# Patient Record
Sex: FEMALE | Race: BLACK OR AFRICAN AMERICAN | NOT HISPANIC OR LATINO | Employment: OTHER | URBAN - METROPOLITAN AREA
[De-identification: names, ages, dates, MRNs, and addresses within clinical notes are randomized per-mention and may not be internally consistent; named-entity substitution may affect disease eponyms.]

---

## 2017-03-19 ENCOUNTER — RADIANT APPOINTMENT (OUTPATIENT)
Dept: GENERAL RADIOLOGY | Facility: CLINIC | Age: 32
End: 2017-03-19
Attending: PHYSICIAN ASSISTANT
Payer: COMMERCIAL

## 2017-03-19 ENCOUNTER — OFFICE VISIT (OUTPATIENT)
Dept: URGENT CARE | Facility: URGENT CARE | Age: 32
End: 2017-03-19
Payer: COMMERCIAL

## 2017-03-19 ENCOUNTER — HOSPITAL ENCOUNTER (EMERGENCY)
Facility: CLINIC | Age: 32
Discharge: HOME | End: 2017-03-19
Attending: CLINICAL NURSE SPECIALIST | Admitting: CLINICAL NURSE SPECIALIST
Payer: COMMERCIAL

## 2017-03-19 ENCOUNTER — APPOINTMENT (OUTPATIENT)
Dept: ULTRASOUND IMAGING | Facility: CLINIC | Age: 32
End: 2017-03-19
Attending: CLINICAL NURSE SPECIALIST
Payer: COMMERCIAL

## 2017-03-19 VITALS
DIASTOLIC BLOOD PRESSURE: 78 MMHG | HEART RATE: 101 BPM | SYSTOLIC BLOOD PRESSURE: 123 MMHG | BODY MASS INDEX: 31.32 KG/M2 | OXYGEN SATURATION: 99 % | RESPIRATION RATE: 18 BRPM | TEMPERATURE: 98.2 F | HEIGHT: 65 IN | WEIGHT: 188 LBS

## 2017-03-19 VITALS
DIASTOLIC BLOOD PRESSURE: 64 MMHG | TEMPERATURE: 97.6 F | OXYGEN SATURATION: 100 % | SYSTOLIC BLOOD PRESSURE: 128 MMHG | HEART RATE: 82 BPM | WEIGHT: 188.1 LBS | BODY MASS INDEX: 31.3 KG/M2

## 2017-03-19 DIAGNOSIS — M25.562 ACUTE PAIN OF LEFT KNEE: ICD-10-CM

## 2017-03-19 DIAGNOSIS — M79.662 PAIN OF LEFT CALF: ICD-10-CM

## 2017-03-19 DIAGNOSIS — Z32.00 PREGNANCY EXAMINATION OR TEST, PREGNANCY UNCONFIRMED: ICD-10-CM

## 2017-03-19 DIAGNOSIS — M25.562 ACUTE PAIN OF LEFT KNEE: Primary | ICD-10-CM

## 2017-03-19 DIAGNOSIS — M71.22 BAKER'S CYST, LEFT: ICD-10-CM

## 2017-03-19 DIAGNOSIS — M25.462 EFFUSION OF LEFT KNEE: ICD-10-CM

## 2017-03-19 LAB
BASOPHILS # BLD AUTO: 0 10E9/L (ref 0–0.2)
BASOPHILS NFR BLD AUTO: 0.3 %
BETA HCG QUAL IFA URINE: NEGATIVE
D DIMER PPP FEU-MCNC: 0.7 UG/ML FEU (ref 0–0.5)
DIFFERENTIAL METHOD BLD: ABNORMAL
EOSINOPHIL # BLD AUTO: 0.2 10E9/L (ref 0–0.7)
EOSINOPHIL NFR BLD AUTO: 3 %
ERYTHROCYTE [DISTWIDTH] IN BLOOD BY AUTOMATED COUNT: 17.7 % (ref 10–15)
HCT VFR BLD AUTO: 39.1 % (ref 35–47)
HGB BLD-MCNC: 12.4 G/DL (ref 11.7–15.7)
LYMPHOCYTES # BLD AUTO: 1.9 10E9/L (ref 0.8–5.3)
LYMPHOCYTES NFR BLD AUTO: 31.2 %
MCH RBC QN AUTO: 28.6 PG (ref 26.5–33)
MCHC RBC AUTO-ENTMCNC: 31.7 G/DL (ref 31.5–36.5)
MCV RBC AUTO: 90 FL (ref 78–100)
MONOCYTES # BLD AUTO: 0.6 10E9/L (ref 0–1.3)
MONOCYTES NFR BLD AUTO: 9.3 %
NEUTROPHILS # BLD AUTO: 3.4 10E9/L (ref 1.6–8.3)
NEUTROPHILS NFR BLD AUTO: 56.2 %
PLATELET # BLD AUTO: 272 10E9/L (ref 150–450)
RBC # BLD AUTO: 4.33 10E12/L (ref 3.8–5.2)
WBC # BLD AUTO: 6 10E9/L (ref 4–11)

## 2017-03-19 PROCEDURE — 36415 COLL VENOUS BLD VENIPUNCTURE: CPT | Performed by: PHYSICIAN ASSISTANT

## 2017-03-19 PROCEDURE — 25000132 ZZH RX MED GY IP 250 OP 250 PS 637: Performed by: CLINICAL NURSE SPECIALIST

## 2017-03-19 PROCEDURE — 85379 FIBRIN DEGRADATION QUANT: CPT | Performed by: PHYSICIAN ASSISTANT

## 2017-03-19 PROCEDURE — 85025 COMPLETE CBC W/AUTO DIFF WBC: CPT | Performed by: PHYSICIAN ASSISTANT

## 2017-03-19 PROCEDURE — 84703 CHORIONIC GONADOTROPIN ASSAY: CPT | Performed by: PHYSICIAN ASSISTANT

## 2017-03-19 PROCEDURE — 93971 EXTREMITY STUDY: CPT | Mod: LT

## 2017-03-19 PROCEDURE — 73562 X-RAY EXAM OF KNEE 3: CPT | Mod: LT

## 2017-03-19 PROCEDURE — 99213 OFFICE O/P EST LOW 20 MIN: CPT | Performed by: PHYSICIAN ASSISTANT

## 2017-03-19 PROCEDURE — 99284 EMERGENCY DEPT VISIT MOD MDM: CPT | Mod: 25

## 2017-03-19 RX ORDER — TRAMADOL HYDROCHLORIDE 50 MG/1
50 TABLET ORAL EVERY 6 HOURS PRN
Qty: 12 TABLET | Refills: 0 | Status: ON HOLD | OUTPATIENT
Start: 2017-03-19 | End: 2018-07-03

## 2017-03-19 RX ORDER — IBUPROFEN 200 MG
600 TABLET ORAL ONCE
Status: COMPLETED | OUTPATIENT
Start: 2017-03-19 | End: 2017-03-19

## 2017-03-19 RX ADMIN — IBUPROFEN 600 MG: 200 TABLET, FILM COATED ORAL at 20:22

## 2017-03-19 ASSESSMENT — ENCOUNTER SYMPTOMS
SHORTNESS OF BREATH: 0
SORE THROAT: 0
COUGH: 0
JOINT SWELLING: 1
WOUND: 0
FEVER: 0
ARTHRALGIAS: 1
RHINORRHEA: 0

## 2017-03-19 NOTE — MR AVS SNAPSHOT
"              After Visit Summary   3/19/2017    Isaías Ontiveros    MRN: [id number]           Patient Information     Date Of Birth          1985        Visit Information        Provider Department      3/19/2017 6:15 PM Teodoro Martinez PA-C [facility]        Today's Diagnoses     Acute pain of left knee    -  1    Pain of left calf        Pregnancy examination or test, pregnancy unconfirmed           Follow-ups after your visit        Who to contact     If you have questions or need follow up information about today's clinic visit or your schedule please contact [facility] directly at [phone].  Normal or non-critical lab and imaging results will be communicated to you by Draftstreethart, letter or phone within 4 business days after the clinic has received the results. If you do not hear from us within 7 days, please contact the clinic through Draftstreethart or phone. If you have a critical or abnormal lab result, we will notify you by phone as soon as possible.  Submit refill requests through Help Scout or call your pharmacy and they will forward the refill request to us. Please allow 3 business days for your refill to be completed.          Additional Information About Your Visit        MyChart Information     Help Scout lets you send messages to your doctor, view your test results, renew your prescriptions, schedule appointments and more. To sign up, go to [email or web address] . Click on \"Log in\" on the left side of the screen, which will take you to the Welcome page. Then click on \"Sign up Now\" on the right side of the page.     You will be asked to enter the access code listed below, as well as some personal information. Please follow the directions to create your username and password.     Your access code is: [email or web address]  Expires: 2017  1:40 PM     Your access code will  in 90 days. If you need help or a new code, please call your [facility] " clinic or [phone].        Care EveryWhere ID     This is your Care EveryWhere ID. This could be used by other organizations to access your [facility] medical records  [id number]        Your Vitals Were     Pulse Temperature Pulse Oximetry BMI (Body Mass Index)          82 97.6  F (36.4  C) (Oral) 100% 31.3 kg/m2         Blood Pressure from Last 3 Encounters:   03/19/17 128/64   01/02/17 111/75   10/13/15 122/78    Weight from Last 3 Encounters:   03/19/17 188 lb 1.6 oz (85.3 kg)   12/31/16 200 lb (90.7 kg)   10/13/15 161 lb 6.4 oz (73.2 kg)              We Performed the Following     Beta HCG qual IFA urine     CBC with platelets and differential     D dimer, quantitative        Primary Care Provider Office Phone # Fax #    Stephen Velásquez [phone]       [facility] [address] [facility] [address]        Thank you!     Thank you for choosing [facility]  for your care. Our goal is always to provide you with excellent care. Hearing back from our patients is one way we can continue to improve our services. Please take a few minutes to complete the written survey that you may receive in the mail after your visit with us. Thank you!             Your Updated Medication List - Protect others around you: Learn how to safely use, store and throw away your medicines at www.disposemymeds.org.          This list is accurate as of: 3/19/17  7:21 PM.  Always use your most recent med list.                   Brand Name Dispense Instructions for use    acetaminophen 500 MG tablet    TYLENOL    30 tablet    Take 1 tablet (500 mg) by mouth every 4 hours as needed for mild pain       breast pump Misc     1 each    1 each as needed       ibuprofen 600 MG tablet    ADVIL/MOTRIN    30 tablet    Take 1 tablet (600 mg) by mouth every 6 hours as needed for other (cramping)       prenatal multivitamin  plus iron 27-0.8 MG Tabs per tablet      Take 1 tablet by mouth daily        senna-docusate 8.6-50 MG per tablet    SENOKOT-S;PERICOLACE    30 tablet    Take 1-2 tablets by mouth 2 times daily as needed for constipation
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
## 2017-03-19 NOTE — ED AVS SNAPSHOT
Emergency Department    [address] [place] [address]    Phone:  [phone]    Fax:  [phone]                                       Isaías Ontiveros   MRN: [id number]    Department:   Emergency Department   Date of Visit:  3/19/2017           After Visit Summary Signature Page     I have received my discharge instructions, and my questions have been answered. I have discussed any challenges I see with this plan with the nurse or doctor.    ..........................................................................................................................................  Patient/Patient Representative Signature      ..........................................................................................................................................  Patient Representative Print Name and Relationship to Patient    ..................................................               ................................................  Date                                            Time    ..........................................................................................................................................  Reviewed by Signature/Title    ...................................................              ..............................................  Date                                                            Time

## 2017-03-19 NOTE — ED AVS SNAPSHOT
Emergency Department    [address] [facility] [address]    Phone:  [phone]    Fax:  [phone]                                       Isaías Ontiveros   MRN: [id number]    Department:   Emergency Department   Date of Visit:  3/19/2017           Patient Information     Date Of Birth          1985        Your diagnoses for this visit were:     Baker's cyst, left     Effusion of left knee        You were seen by Nikia Vasquez, SANDI CNP.      Follow-up Information     Follow up with Orthopaedics, [facility].    Why:  3-7 days as needed, As needed    Contact information:    [address] [place] [address]  [phone]          Discharge Instructions         Sexton s Cyst    You have a Baker s cyst. This is a lump or bulge in the back of your knee. It is caused when extra joint fluid flows into a small sac behind the knee. The extra fluid occurs because arthritis or a torn cartilage irritates the knee joint.  A small Sexton s cyst often has no symptoms. A larger cyst can cause knee pain or a feeling of pressure behind your knee when you try to fully straighten or bend that joint. A Baker s cyst can leak, leading fluid to move down into your lower leg. This causes swelling, pain, and redness.  Treatment involves draining the extra fluid. Or medicine may be injected to reduce redness and swelling. If the extra fluid is caused by a torn cartilage, then surgery to repair the cartilage may be the best treatment option. If arthritis is the cause, and it does not get better with treatment, surgery can be done to remove the cyst.  Home care    If you have knee pain, stay off the affected leg as much as possible until the pain eases.    Apply an ice pack to the painful area for no more than 20 minutes. Do this every 3 to 6 hours for the first 24 to 48 hours. Keep using ice packs 3 to 4 times a day for the next few days, as needed for pain. To make an ice pack, put ice cubes in a sealed  zip-lock plastic bag. Wrap the bag in a clean, thin towel or cloth. Never put ice or an ice pack directly on the skin.    If you were given a hook-and-loop knee brace, you may open the brace to apply ice. Unless told otherwise, you may remove the brace to bathe and sleep.    You may use over-the-counter pain medicine to control pain, unless another medicine was prescribed. Talk with your provider before using these medicines if you have chronic liver or kidney disease, or have ever had a stomach ulcer or GI (gastrointestinal) bleeding.       If crutches or a walker have been recommended, don t bear full weight on your injured leg until you can do so without pain. Check with your provider before returning to sports or full work duties.  Follow-up care  Follow up with your healthcare provider within 1 to 2 weeks, or as advised.  If X-rays were taken, you will be notified of any new findings that may affect your care.  When to seek medical advice  Call your healthcare provider right away if any of these occur:    Toes or foot become swollen, cold, blue, numb or tingly    Pain or swelling increases    Warmth or redness appears over the knee    Redness, swelling or pain occurs in the calf or lower leg    6257-6659 [employer]. [address] [place] [address], [place] [address]. All rights reserved. This information is not intended as a substitute for professional medical care. Always follow your healthcare professional's instructions.          Water on the Knee    Water on the knee is also known as knee effusion. The knee joint normally has less than 1 ounce of fluid. Injury or inflammation of the knee joint causes extra fluid to collect there. When this happens, the knee joint looks swollen and is usually painful. It may be hard to fully bend the knee.  The most common cause of water on the knee is osteoarthritis due to wear and tear on the joint cartilage. Other causes include injury to the cartilage, inflammatory  arthritis such as gout or rheumatoid arthritis, and infection of the joint.  You may need a needle aspiration, if the cause of your water on the knee is not certain. This procedure removes a sample of joint fluid from the knee for testing. This is done with a local anesthetic. Removing excess fluid may also relieve swelling and pain.  Home care    Limit your activities. Stay off the injured leg as much as possible until pain improves.    Keep your leg elevated to reduce pain and swelling. When sleeping, place a pillow under the injured leg. When sitting, support the injured leg so it is level with your waist. This is very important during the first 48 hours.    Apply an ice pack over the injured area for 15 to 20 minutes every 3 to 6 hours. You should do this for the first 24 to 48 hours. You can make an ice pack by filling a plastic bag that seals at the top with ice cubes and then wrapping it with a thin towel. Continue to use ice packs for relief of pain and swelling as needed. As the ice melts, be careful to avoid getting your wrap, splint, or cast wet. After 48 hours, apply heat(warm shower or warm bath) for 15 to 20 minutes several times a day, or alternate ice and heat. If you have to wear a hook-and-loop knee brace, you can open it to apply the ice pack, or heat, directly to the knee. Never put ice directly on the skin. Always wrap the ice in a towel or other type of cloth.    You may use over-the-counter pain medicine to control pain, unless another pain medicine was prescribed. If you have chronic liver or kidney disease or have ever had a stomach ulcer or GI bleeding, talk with your healthcare provider before using these medicines.    If crutches or a walker have been recommended, do not put weight on the injured leg until you can do so without pain. Check with your healthcare provider before returning to sports or full work duties.    If you have a hook-and-loop knee brace, you can remove it to bathe and  sleep, unless told otherwise.  Follow-up care  Follow up with your healthcare provider as advised.  If you are overweight, talk to your healthcare provider about a weight loss program. The excess weight puts extra strain on your knees.  When to seek medical advice  Call your healthcare provider right away if any of these occur:    Increasing pain, redness, or swelling of the knee    Fever of 100.4 F (38 C) or above lasting for 24 to 48 hours    5862-3345 [facility]. [address] [place] [address]. All rights reserved. This information is not intended as a substitute for professional medical care. Always follow your healthcare professional's instructions.          24 Hour Appointment Hotline       To make an appointment at any [facility], call [phone] ([phone]). If you don't have a family doctor or clinic, we will help you find one. [place] clinics are conveniently located to serve the needs of you and your family.             Review of your medicines      Our records show that you are taking the medicines listed below. If these are incorrect, please call your family doctor or clinic.        Dose / Directions Last dose taken    acetaminophen 500 MG tablet   Commonly known as:  TYLENOL   Dose:  500 mg   Quantity:  30 tablet        Take 1 tablet (500 mg) by mouth every 4 hours as needed for mild pain   Refills:  1        breast pump Misc   Dose:  1 each   Quantity:  1 each        1 each as needed   Refills:  0        ibuprofen 600 MG tablet   Commonly known as:  ADVIL/MOTRIN   Dose:  600 mg   Quantity:  30 tablet        Take 1 tablet (600 mg) by mouth every 6 hours as needed for other (cramping)   Refills:  1        prenatal multivitamin  plus iron 27-0.8 MG Tabs per tablet   Dose:  1 tablet        Take 1 tablet by mouth daily   Refills:  0        senna-docusate 8.6-50 MG per tablet   Commonly known as:  SENOKOT-S;PERICOLACE   Dose:  1-2 tablet   Quantity:  30 tablet         Take 1-2 tablets by mouth 2 times daily as needed for constipation   Refills:  1                Procedures and tests performed during your visit     US Lower Extremity Venous Duplex Left      Orders Needing Specimen Collection     None      Pending Results     No orders found from 3/17/2017 to 3/20/2017.            Pending Culture Results     No orders found from 3/17/2017 to 3/20/2017.             Test Results from your hospital stay     3/19/2017  8:56 PM - Interface, Radiant Ib      Narrative     US LOWER EXTREMITY VENOUS DUPLEX LEFT3/19/2017 8:53 PM    HISTORY:  pain, sent from     TECHNIQUE:Venous Doppler US. Color flow and spectral Doppler with  waveform analysis performed.    COMPARISON: None.    FINDINGS: The left lower extremity venous ultrasound is negative for  DVT.  The veins do augment and compress normally.  No thrombus is  seen. There is a complex fluid collection in the popliteal fossa  measuring 4.9 x 2.2 x 2.8 cm. This is consistent with a Baker's cyst.        Impression     IMPRESSION:   1. Negative for DVT.  2. Baker's cyst.    JAI LERNER MD                Clinical Quality Measure: Blood Pressure Screening     Your blood pressure was checked while you were in the emergency department today. The last reading we obtained was  BP: 123/78 . Please read the guidelines below about what these numbers mean and what you should do about them.  If your systolic blood pressure (the top number) is less than 120 and your diastolic blood pressure (the bottom number) is less than 80, then your blood pressure is normal. There is nothing more that you need to do about it.  If your systolic blood pressure (the top number) is 120-139 or your diastolic blood pressure (the bottom number) is 80-89, your blood pressure may be higher than it should be. You should have your blood pressure rechecked within a year by a primary care provider.  If your systolic blood pressure (the top number) is 140 or greater or your  "diastolic blood pressure (the bottom number) is 90 or greater, you may have high blood pressure. High blood pressure is treatable, but if left untreated over time it can put you at risk for heart attack, stroke, or kidney failure. You should have your blood pressure rechecked by a primary care provider within the next 4 weeks.  If your provider in the emergency department today gave you specific instructions to follow-up with your doctor or provider even sooner than that, you should follow that instruction and not wait for up to 4 weeks for your follow-up visit.        Thank you for choosing [facility]       Thank you for choosing [facility] for your care. Our goal is always to provide you with excellent care. Hearing back from our patients is one way we can continue to improve our services. Please take a few minutes to complete the written survey that you may receive in the mail after you visit with us. Thank you!        [facility] Information     RetentionGrid lets you send messages to your doctor, view your test results, renew your prescriptions, schedule appointments and more. To sign up, go to [email or web address] . Click on \"Log in\" on the left side of the screen, which will take you to the Welcome page. Then click on \"Sign up Now\" on the right side of the page.     You will be asked to enter the access code listed below, as well as some personal information. Please follow the directions to create your username and password.     Your access code is: [email or web address]  Expires: 2017  1:40 PM     Your access code will  in 90 days. If you need help or a new code, please call your [facility] clinic or [phone].        Care EveryWhere ID     This is your Care EveryWhere ID. This could be used by other organizations to access your [facility] medical records  [id number]        After Visit Summary       This is your record. Keep this with you and show to your community pharmacist(s) and doctor(s) at your next visit.  "

## 2017-03-19 NOTE — NURSING NOTE
"Initial /64  Pulse 82  Temp 97.6  F (36.4  C) (Oral)  Wt 188 lb 1.6 oz (85.3 kg)  SpO2 100%  BMI 31.3 kg/m2 Estimated body mass index is 31.3 kg/(m^2) as calculated from the following:    Height as of 12/31/16: 5' 5\" (1.651 m).    Weight as of this encounter: 188 lb 1.6 oz (85.3 kg). .      "

## 2017-03-20 NOTE — ED PROVIDER NOTES
"  History     Chief Complaint:  Knee Pain     HPI   Isaías Ontiveros is a 32 year old female who presents to the emergency department today for evaluation of knee pain. Per chart review, the patient presented to [facility] earlier this evening for similar presentation. At this time, she had an x-ray that demonstrated mild effusion and no acute osseus abnormality. She also had an unremarkable CBC (WBC 6, HGB 12.4, ). However, her d-dimer was elevated to 0.7, therefore, referred to the patient to the ED for further DVT work up and possible ultrasound. Upon presentation, she reports her pain started two days ago and has been worsening therefore prompting her visits for further evaluation. She reports the knee is swollen and can feel the pain \"inside\" the knee and the proximal calf. Additionally, she gave birth two months ago and is not on any current hormone therapy. Furthermore, the patient started working out again six days ago, and reports her  is pushing her hard. She has not taken any analgesics. The patient denies cough and cold symptoms, chest pain, and shortness of breath.     PE/DVT Risk Factors:   Hx of PE/DVT:                        Neg  Hx of clotting disorder:            Neg  Hx of cancer:                          Neg  Tobacco use:                          Neg  Hormone therapy:                   Neg  Pregnancy:                              Neg  Prolonged immobilization:       Neg  Recent surgery:                       Neg  Recent travel:                          Neg  Familial Hx of PE/DVT:           Neg    Allergies:  Chocolate  Egg [Chicken-Derived Products (Egg)]  Peanut Oil     Medications:    Senna-docusate     Past Medical History:    Anemia  Mental disorder     Past Surgical History:    History reviewed. No pertinent past surgical history.    Family History:    History reviewed. No pertinent family history.     Social History:  The patient was accompanied to the ED by " "herself.  Smoking Status: Never  Alcohol Use: No  Marital Status:  Single      Review of Systems   Constitutional: Negative for fever.   HENT: Negative for congestion, ear pain, rhinorrhea and sore throat.    Respiratory: Negative for cough and shortness of breath.    Cardiovascular: Negative for chest pain.   Musculoskeletal: Positive for arthralgias (left knee ) and joint swelling (left knee). Negative for gait problem.        Left proximal calf pain   Skin: Negative for wound.   All other systems reviewed and are negative.    Physical Exam   First Vitals:  BP: 123/78  Pulse: 101  Temp: 98.2  F (36.8  C)  Resp: 18  Height: 165.1 cm (5' 5\")  Weight: 85.3 kg (188 lb)  SpO2: 99 %      Physical Exam  Physical Exam   Constitutional: Pt appears well-developed and well-nourished. Non toxic appearing.   ENT: Oropharynx is clear and moist.   Eyes: EOMs intact. Pupils are equal, round, and reactive to light.    Cardiovascular: Regular rate and rhythm. Normal heart sounds. No concerning murmur.  Pulmonary/Chest: No respiratory distress.  Breath sounds normal.   Musculoskeletal: Some crepitus with flexion and extension of left knee. No obvious asymmetry in lower leg size. No redness, swelling, or warmth. Distal CMS intact. Tenderness of the anterior knee and proximal calf area.   Neurological: No focal deficits.   Skin: Skin is warm, dry.    Emergency Department Course     Imaging:  Radiology findings were communicated with the patient who voiced understanding of the findings.  US Lower Extremity Venous Duplex  IMPRESSION:   1. Negative for DVT.  2. Baker's cyst.  Report per radiology     Interventions:  2022 Ibuprofen 600mg PO     Emergency Department Course:  Nursing notes and vitals reviewed.  The patient was sent for a US Lower Extremity Venous Duplex while in the emergency department, results above.   2018: I performed an exam of the patient as documented above.   2055: Patient updated.   I discussed the treatment plan " with the patient. They expressed understanding of this plan and consented to discharge. They will be discharged home with instructions for care and follow up. In addition, the patient will return to the emergency department if their symptoms persist, worsen, if new symptoms arise or if there is any concern.  All questions were answered.  I personally reviewed the imaging results with the Patient and answered all related questions prior to discharge.    Impression & Plan      Medical Decision Making:  Isaías Ontiveros is a 32 year old female who presents for evaluation of left calf and knee swelling and pain. A broad differential was considered including Baker's cyst rupture, Baker's cyst, hematoma, rupture, compartment syndrome, muscle rupture, DVT, superficial thrombophlebitis, compression of the venous structures higher up in the abdomen and or leg. The symptoms here are consistent with a Baker's cyst, probably ruptured. There are no signs of compartment syndrome or other worrisome etiologies at this point so outpatient management is indicated. They will elevate leg, do gentle dorsal flexion and plantar flexion motions, take Tylenol for pain, and avoid strenuous activity. Patient given a knee immobilizer. They should followup with orthopedics in 3 days.      Diagnosis:    ICD-10-CM    1. Baker's cyst, left M71.22    2. Effusion of left knee M25.462      Disposition:   Discharged home    Discharge Medications:  New Prescriptions    TRAMADOL (ULTRAM) 50 MG TABLET    Take 1 tablet (50 mg) by mouth every 6 hours as needed for pain maximum 4  tablet(s) per day       Scribe Disclosure:  Earlene GARDINER, am serving as a scribe at 8:07 PM on 3/19/2017 to document services personally performed by Nikia Vasquez NP, based on my observations and the provider's statements to me.    3/19/2017    EMERGENCY DEPARTMENT         Nikia Vasquez, SANDI CNP  03/19/17 2136

## 2017-03-20 NOTE — DISCHARGE INSTRUCTIONS
Sexton s Cyst    You have a Baker s cyst. This is a lump or bulge in the back of your knee. It is caused when extra joint fluid flows into a small sac behind the knee. The extra fluid occurs because arthritis or a torn cartilage irritates the knee joint.  A small Sexton s cyst often has no symptoms. A larger cyst can cause knee pain or a feeling of pressure behind your knee when you try to fully straighten or bend that joint. A Baker s cyst can leak, leading fluid to move down into your lower leg. This causes swelling, pain, and redness.  Treatment involves draining the extra fluid. Or medicine may be injected to reduce redness and swelling. If the extra fluid is caused by a torn cartilage, then surgery to repair the cartilage may be the best treatment option. If arthritis is the cause, and it does not get better with treatment, surgery can be done to remove the cyst.  Home care    If you have knee pain, stay off the affected leg as much as possible until the pain eases.    Apply an ice pack to the painful area for no more than 20 minutes. Do this every 3 to 6 hours for the first 24 to 48 hours. Keep using ice packs 3 to 4 times a day for the next few days, as needed for pain. To make an ice pack, put ice cubes in a sealed zip-lock plastic bag. Wrap the bag in a clean, thin towel or cloth. Never put ice or an ice pack directly on the skin.    If you were given a hook-and-loop knee brace, you may open the brace to apply ice. Unless told otherwise, you may remove the brace to bathe and sleep.    You may use over-the-counter pain medicine to control pain, unless another medicine was prescribed. Talk with your provider before using these medicines if you have chronic liver or kidney disease, or have ever had a stomach ulcer or GI (gastrointestinal) bleeding.       If crutches or a walker have been recommended, don t bear full weight on your injured leg until you can do so without pain. Check with your provider before  returning to sports or full work duties.  Follow-up care  Follow up with your healthcare provider within 1 to 2 weeks, or as advised.  If X-rays were taken, you will be notified of any new findings that may affect your care.  When to seek medical advice  Call your healthcare provider right away if any of these occur:    Toes or foot become swollen, cold, blue, numb or tingly    Pain or swelling increases    Warmth or redness appears over the knee    Redness, swelling or pain occurs in the calf or lower leg    5529-5275 [employer]. [address] [place] [address]. All rights reserved. This information is not intended as a substitute for professional medical care. Always follow your healthcare professional's instructions.          Water on the Knee    Water on the knee is also known as knee effusion. The knee joint normally has less than 1 ounce of fluid. Injury or inflammation of the knee joint causes extra fluid to collect there. When this happens, the knee joint looks swollen and is usually painful. It may be hard to fully bend the knee.  The most common cause of water on the knee is osteoarthritis due to wear and tear on the joint cartilage. Other causes include injury to the cartilage, inflammatory arthritis such as gout or rheumatoid arthritis, and infection of the joint.  You may need a needle aspiration, if the cause of your water on the knee is not certain. This procedure removes a sample of joint fluid from the knee for testing. This is done with a local anesthetic. Removing excess fluid may also relieve swelling and pain.  Home care    Limit your activities. Stay off the injured leg as much as possible until pain improves.    Keep your leg elevated to reduce pain and swelling. When sleeping, place a pillow under the injured leg. When sitting, support the injured leg so it is level with your waist. This is very important during the first 48 hours.    Apply an ice pack over the  injured area for 15 to 20 minutes every 3 to 6 hours. You should do this for the first 24 to 48 hours. You can make an ice pack by filling a plastic bag that seals at the top with ice cubes and then wrapping it with a thin towel. Continue to use ice packs for relief of pain and swelling as needed. As the ice melts, be careful to avoid getting your wrap, splint, or cast wet. After 48 hours, apply heat(warm shower or warm bath) for 15 to 20 minutes several times a day, or alternate ice and heat. If you have to wear a hook-and-loop knee brace, you can open it to apply the ice pack, or heat, directly to the knee. Never put ice directly on the skin. Always wrap the ice in a towel or other type of cloth.    You may use over-the-counter pain medicine to control pain, unless another pain medicine was prescribed. If you have chronic liver or kidney disease or have ever had a stomach ulcer or GI bleeding, talk with your healthcare provider before using these medicines.    If crutches or a walker have been recommended, do not put weight on the injured leg until you can do so without pain. Check with your healthcare provider before returning to sports or full work duties.    If you have a hook-and-loop knee brace, you can remove it to bathe and sleep, unless told otherwise.  Follow-up care  Follow up with your healthcare provider as advised.  If you are overweight, talk to your healthcare provider about a weight loss program. The excess weight puts extra strain on your knees.  When to seek medical advice  Call your healthcare provider right away if any of these occur:    Increasing pain, redness, or swelling of the knee    Fever of 100.4 F (38 C) or above lasting for 24 to 48 hours    3639-3782 [employer]. [address] [place] [address], [place] [address]. All rights reserved. This information is not intended as a substitute for professional medical care. Always follow your healthcare professional's instructions.

## 2017-12-14 LAB
HBV SURFACE AG SERPL QL IA: NEGATIVE
HIV 1+2 AB+HIV1 P24 AG SERPL QL IA: NONREACTIVE
PAP SMEAR - HIM PATIENT REPORTED: NORMAL
RUBELLA ANTIBODY IGG QUANTITATIVE: NORMAL IU/ML

## 2018-01-10 ENCOUNTER — TRANSFERRED RECORDS (OUTPATIENT)
Dept: HEALTH INFORMATION MANAGEMENT | Facility: CLINIC | Age: 33
End: 2018-01-10

## 2018-01-10 DIAGNOSIS — O99.012 ANEMIA COMPLICATING PREGNANCY IN SECOND TRIMESTER: Primary | ICD-10-CM

## 2018-01-10 DIAGNOSIS — D52.9 FOLATE DEFICIENCY ANEMIA, UNSPECIFIED: ICD-10-CM

## 2018-01-15 ENCOUNTER — TELEPHONE (OUTPATIENT)
Dept: INFUSION THERAPY | Facility: CLINIC | Age: 33
End: 2018-01-15

## 2018-01-17 NOTE — TELEPHONE ENCOUNTER
Spoke with patient- she is declinig the iron infusion order given by her MD santa Cespedes. Patient states she has informed her MD.

## 2018-06-08 LAB — GROUP B STREP PCR: NEGATIVE

## 2018-07-01 ENCOUNTER — APPOINTMENT (OUTPATIENT)
Dept: ULTRASOUND IMAGING | Facility: CLINIC | Age: 33
End: 2018-07-01
Attending: OBSTETRICS & GYNECOLOGY
Payer: COMMERCIAL

## 2018-07-01 ENCOUNTER — HOSPITAL ENCOUNTER (INPATIENT)
Facility: CLINIC | Age: 33
LOS: 2 days | Discharge: HOME HEALTH | End: 2018-07-03
Attending: OBSTETRICS & GYNECOLOGY | Admitting: OBSTETRICS & GYNECOLOGY
Payer: COMMERCIAL

## 2018-07-01 LAB
ABO + RH BLD: NORMAL
ABO + RH BLD: NORMAL
FERN CRYSTALLIZATION: NORMAL
SPECIMEN EXP DATE BLD: NORMAL

## 2018-07-01 PROCEDURE — 25000128 H RX IP 250 OP 636: Performed by: OBSTETRICS & GYNECOLOGY

## 2018-07-01 PROCEDURE — 25000132 ZZH RX MED GY IP 250 OP 250 PS 637: Performed by: OBSTETRICS & GYNECOLOGY

## 2018-07-01 PROCEDURE — 76819 FETAL BIOPHYS PROFIL W/O NST: CPT

## 2018-07-01 PROCEDURE — 86780 TREPONEMA PALLIDUM: CPT | Performed by: OBSTETRICS & GYNECOLOGY

## 2018-07-01 PROCEDURE — 86901 BLOOD TYPING SEROLOGIC RH(D): CPT | Performed by: OBSTETRICS & GYNECOLOGY

## 2018-07-01 PROCEDURE — 86900 BLOOD TYPING SEROLOGIC ABO: CPT | Performed by: OBSTETRICS & GYNECOLOGY

## 2018-07-01 PROCEDURE — 12000031 ZZH R&B OB CRITICAL

## 2018-07-01 RX ORDER — OXYCODONE AND ACETAMINOPHEN 5; 325 MG/1; MG/1
1 TABLET ORAL
Status: COMPLETED | OUTPATIENT
Start: 2018-07-01 | End: 2018-07-02

## 2018-07-01 RX ORDER — OXYTOCIN 10 [USP'U]/ML
10 INJECTION, SOLUTION INTRAMUSCULAR; INTRAVENOUS
Status: DISCONTINUED | OUTPATIENT
Start: 2018-07-01 | End: 2018-07-03

## 2018-07-01 RX ORDER — NALOXONE HYDROCHLORIDE 0.4 MG/ML
.1-.4 INJECTION, SOLUTION INTRAMUSCULAR; INTRAVENOUS; SUBCUTANEOUS
Status: DISCONTINUED | OUTPATIENT
Start: 2018-07-01 | End: 2018-07-03

## 2018-07-01 RX ORDER — ACETAMINOPHEN 325 MG/1
650 TABLET ORAL EVERY 4 HOURS PRN
Status: DISCONTINUED | OUTPATIENT
Start: 2018-07-01 | End: 2018-07-03

## 2018-07-01 RX ORDER — NALBUPHINE HYDROCHLORIDE 10 MG/ML
2.5-5 INJECTION, SOLUTION INTRAMUSCULAR; INTRAVENOUS; SUBCUTANEOUS EVERY 6 HOURS PRN
Status: DISCONTINUED | OUTPATIENT
Start: 2018-07-01 | End: 2018-07-02 | Stop reason: HOSPADM

## 2018-07-01 RX ORDER — SODIUM CHLORIDE, SODIUM LACTATE, POTASSIUM CHLORIDE, CALCIUM CHLORIDE 600; 310; 30; 20 MG/100ML; MG/100ML; MG/100ML; MG/100ML
INJECTION, SOLUTION INTRAVENOUS CONTINUOUS
Status: DISCONTINUED | OUTPATIENT
Start: 2018-07-01 | End: 2018-07-03

## 2018-07-01 RX ORDER — METHYLERGONOVINE MALEATE 0.2 MG/ML
200 INJECTION INTRAVENOUS
Status: DISCONTINUED | OUTPATIENT
Start: 2018-07-01 | End: 2018-07-03

## 2018-07-01 RX ORDER — IBUPROFEN 800 MG/1
800 TABLET, FILM COATED ORAL
Status: DISCONTINUED | OUTPATIENT
Start: 2018-07-01 | End: 2018-07-03

## 2018-07-01 RX ORDER — SCOLOPAMINE TRANSDERMAL SYSTEM 1 MG/1
1 PATCH, EXTENDED RELEASE TRANSDERMAL ONCE
Status: DISCONTINUED | OUTPATIENT
Start: 2018-07-01 | End: 2018-07-02 | Stop reason: HOSPADM

## 2018-07-01 RX ORDER — EPHEDRINE SULFATE 50 MG/ML
5 INJECTION, SOLUTION INTRAMUSCULAR; INTRAVENOUS; SUBCUTANEOUS
Status: DISCONTINUED | OUTPATIENT
Start: 2018-07-01 | End: 2018-07-02 | Stop reason: HOSPADM

## 2018-07-01 RX ORDER — CARBOPROST TROMETHAMINE 250 UG/ML
250 INJECTION, SOLUTION INTRAMUSCULAR
Status: DISCONTINUED | OUTPATIENT
Start: 2018-07-01 | End: 2018-07-03

## 2018-07-01 RX ORDER — FENTANYL CITRATE 50 UG/ML
100 INJECTION, SOLUTION INTRAMUSCULAR; INTRAVENOUS ONCE
Status: DISCONTINUED | OUTPATIENT
Start: 2018-07-01 | End: 2018-07-02 | Stop reason: HOSPADM

## 2018-07-01 RX ORDER — ONDANSETRON 2 MG/ML
4 INJECTION INTRAMUSCULAR; INTRAVENOUS EVERY 6 HOURS PRN
Status: DISCONTINUED | OUTPATIENT
Start: 2018-07-01 | End: 2018-07-03

## 2018-07-01 RX ORDER — NALOXONE HYDROCHLORIDE 0.4 MG/ML
.1-.4 INJECTION, SOLUTION INTRAMUSCULAR; INTRAVENOUS; SUBCUTANEOUS
Status: DISCONTINUED | OUTPATIENT
Start: 2018-07-01 | End: 2018-07-02 | Stop reason: HOSPADM

## 2018-07-01 RX ORDER — OXYTOCIN/0.9 % SODIUM CHLORIDE 30/500 ML
100-340 PLASTIC BAG, INJECTION (ML) INTRAVENOUS CONTINUOUS PRN
Status: COMPLETED | OUTPATIENT
Start: 2018-07-01 | End: 2018-07-02

## 2018-07-01 RX ORDER — FENTANYL CITRATE 50 UG/ML
50-100 INJECTION, SOLUTION INTRAMUSCULAR; INTRAVENOUS
Status: DISCONTINUED | OUTPATIENT
Start: 2018-07-01 | End: 2018-07-03

## 2018-07-01 RX ADMIN — ACETAMINOPHEN 650 MG: 325 TABLET, FILM COATED ORAL at 21:15

## 2018-07-01 RX ADMIN — SODIUM CHLORIDE, POTASSIUM CHLORIDE, SODIUM LACTATE AND CALCIUM CHLORIDE: 600; 310; 30; 20 INJECTION, SOLUTION INTRAVENOUS at 18:30

## 2018-07-01 NOTE — IP AVS SNAPSHOT
MRN:[id number]                      After Visit Summary   7/1/2018    Isaías Ontiveros    MRN: [id number]           Thank you!     Thank you for choosing [facility] for your care. Our goal is always to provide you with excellent care. Hearing back from our patients is one way we can continue to improve our services. Please take a few minutes to complete the written survey that you may receive in the mail after you visit. If you would like to speak to someone directly about your visit please contact Patient Relations at [phone]. Thank you!          Patient Information     Date Of Birth          1985        Designated Caregiver       Most Recent Value    Caregiver    Name of designated caregiver self      About your hospital stay     You were admitted on:  July 1, 2018 You last received care in the:  [facility]    You were discharged on:  July 3, 2018       Who to Call     For medical emergencies, please call 911.  For non-urgent questions about your medical care, please call your primary care provider or clinic, None          Attending Provider     Provider Specialty    Janeth Cespedes MD OB/Gyn       Primary Care Provider Fax #    Physician No Ref-Primary [phone]      Further instructions from your care team       Postpartum Vaginal Delivery Instructions    Lactation [phone]    [facility] [phone]    Activity       Ask family and friends for help when you need it.    Do not place anything in your vagina for 6 weeks.    You are not restricted on other activities, but take it easy for a few weeks to allow your body to recover from delivery.  You are able to do any activities you feel up to that point.    No driving until you have stopped taking your pain medications (usually two weeks after delivery).     Call your health care provider if you have any of these symptoms:       Increased pain, swelling, redness, or fluid around  "your stiches from an episiotomy or perineal tear.    A fever above 100.4 F (38 C) with or without chills when placing a thermometer under your tongue.    You soak a sanitary pad with blood within 1 hour, or you see blood clots larger than a golf ball.    Bleeding that lasts more than 6 weeks.    Vaginal discharge that smells bad.    Severe pain, cramping or tenderness in your lower belly area.    A need to urinate more frequently (use the toilet more often), more urgently (use the toilet very quickly), or it burns when you urinate.    Nausea and vomiting.    Redness, swelling or pain around a vein in your leg.    Problems breastfeeding or a red or painful area on your breast.    Chest pain and cough or are gasping for air.    Problems coping with sadness, anxiety, or depression.  If you have any concerns about hurting yourself or the baby, call your provider immediately.     You have questions or concerns after you return home.     Keep your hands clean:  Always wash your hands before touching your perineal area and stitches.  This helps reduce your risk of infection.  If your hands aren't dirty, you may use an alcohol hand-rub to clean your hands. Keep your nails clean and short.        Pending Results     Date and Time Order Name Status Description    7/3/2018 0601 Rh Immune Globulin Study In process             Statement of Approval     Ordered          07/03/18 0836  I have reviewed and agree with all the recommendations and orders detailed in this document.  EFFECTIVE NOW     Approved and electronically signed by:  Armani Sylvester MD             Admission Information     Date & Time Provider Department Dept. Phone    7/1/2018 Janeth Cespedes MD [facility] [phone]      Your Vitals Were     Blood Pressure Pulse Temperature Respirations Height Weight    101/59 69 98.2  F (36.8  C) (Oral) 16 1.651 m (5' 5\") 90.7 kg (200 lb)    BMI (Body Mass Index)                   33.28 " "kg/m2           MyChart Information     [employer] lets you send messages to your doctor, view your test results, renew your prescriptions, schedule appointments and more. To sign up, go to [email or web address] . Click on \"Log in\" on the left side of the screen, which will take you to the Welcome page. Then click on \"Sign up Now\" on the right side of the page.     You will be asked to enter the access code listed below, as well as some personal information. Please follow the directions to create your username and password.     Your access code is: [email or web address]  Expires: 10/1/2018 10:37 AM     Your access code will  in 90 days. If you need help or a new code, please call your [facility] clinic or [phone].        Care EveryWhere ID     This is your Care EveryWhere ID. This could be used by other organizations to access your [facility] medical records  [id number]        Equal Access to Services     MACIE THOMAS : Hadii kassidy thompsono Sooscar, waaxda luqadaha, qaybta kaalmada adeegyada, dom cash . So [facility] [phone].    ATENCIÓN: Si habla español, tiene a mayer disposición servicios gratuitos de asistencia lingüística. Llame al [phone].    We comply with applicable federal civil rights laws and [place] laws. We do not discriminate on the basis of race, color, national origin, age, disability, sex, sexual orientation, or gender identity.               Review of your medicines      CONTINUE these medicines which have NOT CHANGED        Dose / Directions    acetaminophen 500 MG tablet   Commonly known as:  TYLENOL   Used for:  Vaginal delivery        Dose:  500 mg   Take 1 tablet (500 mg) by mouth every 4 hours as needed for mild pain   Quantity:  30 tablet   Refills:  1       breast pump Misc   Used for:  Breast feeding status of mother        Dose:  1 each   1 each as needed   Quantity:  1 each   Refills:  0       ibuprofen 600 MG tablet   Commonly known as:  ADVIL/MOTRIN "   Used for:  Vaginal delivery        Dose:  600 mg   Take 1 tablet (600 mg) by mouth every 6 hours as needed for other (cramping)   Quantity:  30 tablet   Refills:  1       prenatal multivitamin plus iron 27-0.8 MG Tabs per tablet        Dose:  1 tablet   Take 1 tablet by mouth daily   Refills:  0       senna-docusate 8.6-50 MG per tablet   Commonly known as:  SENOKOT-S;PERICOLACE   Used for:  Vaginal delivery        Dose:  1-2 tablet   Take 1-2 tablets by mouth 2 times daily as needed for constipation   Quantity:  30 tablet   Refills:  1         STOP taking     traMADol 50 MG tablet   Commonly known as:  ULTRAM                    Protect others around you: Learn how to safely use, store and throw away your medicines at www.disposemymeds.org.             Medication List: This is a list of all your medications and when to take them. Check marks below indicate your daily home schedule. Keep this list as a reference.      Medications           Morning Afternoon Evening Bedtime As Needed    acetaminophen 500 MG tablet   Commonly known as:  TYLENOL   Take 1 tablet (500 mg) by mouth every 4 hours as needed for mild pain   Last time this was given:  650 mg on 7/2/2018  6:54 PM                                breast pump Misc   1 each as needed                                ibuprofen 600 MG tablet   Commonly known as:  ADVIL/MOTRIN   Take 1 tablet (600 mg) by mouth every 6 hours as needed for other (cramping)   Last time this was given:  800 mg on 7/3/2018  7:20 AM                                prenatal multivitamin plus iron 27-0.8 MG Tabs per tablet   Take 1 tablet by mouth daily                                senna-docusate 8.6-50 MG per tablet   Commonly known as:  SENOKOT-S;PERICOLACE   Take 1-2 tablets by mouth 2 times daily as needed for constipation   Last time this was given:  1 tablet on 7/3/2018  9:42 AM

## 2018-07-01 NOTE — PROVIDER NOTIFICATION
07/01/18 1749   Provider Notification   Provider Name/Title Dr. Cespedes   Method of Notification In Department     Orders to keep patient admitted due to low ELENA. Has had cervical change in department, monitor at this time, consider augmentation after cervical exam at 2000. Ok for intermittent auscultation. Intrapartum orders placed.

## 2018-07-01 NOTE — PROVIDER NOTIFICATION
07/01/18 1606   Provider Notification   Provider Name/Title Dr. Cespedes   Method of Notification In Department     Updated regarding results of fern and nitrazine test, no ferning present. Await BPP and then will make plan of care.

## 2018-07-01 NOTE — PLAN OF CARE
Data: [facility] presented to [facility]: 2018  1:22 PM.  Reason for maternal/fetal assessment is uterine contractions. Patient reports uterine contractions that have become more regular and painful at home, patient also report rectal pressure similar to bowel movement pressure, patient reports she has not had a bowel movement since  and has experienced constipation this pregnancy.  Patient is a .  Prenatal record reviewed. Pregnancy has been uncomplicated..  Gestational Age 39w6d. VSS. Fetal movement present. Patient denies leaking of vaginal fluid/rupture of membranes, nausea, vomiting, headache, visual disturbances, epigastric or URQ pain, significant edema. Support person is present.   Action: Verbal consent for EFM, uterine contractions palpating mild, fetal movement present. SVE 3/40/-4, cephalic presentation. Triage assessment completed. Bill of rights reviewed.  Response: Patient verbalized agreement with plan. Will contact Dr Janeth Cespedes with update and further orders.

## 2018-07-01 NOTE — PROVIDER NOTIFICATION
07/01/18 1450   Provider Notification   Provider Name/Title Dr. Cespedes   Method of Notification Phone     Updated MD regarding inability to obtain reactive NST tracing despite, repositioning, fluids, food and emptying of bladder. MD coming to department in 5 minutes and will assess patient.

## 2018-07-01 NOTE — PROVIDER NOTIFICATION
07/01/18 1406   Provider Notification   Provider Name/Title Dr. Cespedes   Method of Notification Phone     MD updated regarding patient arrival, complaint of uterine contractions similar to a menstrual cramp, and rectal pressure that has now resolved with a large bowel movement. FHT with moderate variability, no accelerations at this time. Per MD, continue to monitor until a reactive NST is obtained, then patient may discharge to home.

## 2018-07-01 NOTE — H&P
No significant change in general health status based on examination of the patient, review of Nursing Admission Database and prenatal record. Admitted for latent labor, with nonreactive NST and oligohydramnios noted on BPP. GBS negative.     EFW: 7.5#    Janeth Cespedes MD

## 2018-07-01 NOTE — IP AVS SNAPSHOT
[facility]    [address] [facility] [address]    Phone:  [phone]    Fax:  [phone]                                       After Visit Summary   7/1/2018    Isaías Ontiveros    MRN: [id number]           After Visit Summary Signature Page     I have received my discharge instructions, and my questions have been answered. I have discussed any challenges I see with this plan with the nurse or doctor.    ..........................................................................................................................................  Patient/Patient Representative Signature      ..........................................................................................................................................  Patient Representative Print Name and Relationship to Patient    ..................................................               ................................................  Date                                            Time    ..........................................................................................................................................  Reviewed by Signature/Title    ...................................................              ..............................................  Date                                                            Time

## 2018-07-02 LAB
BLOOD BANK CMNT PATIENT-IMP: NORMAL
T PALLIDUM AB SER QL: NONREACTIVE

## 2018-07-02 PROCEDURE — 12000029 ZZH R&B OB INTERMEDIATE

## 2018-07-02 PROCEDURE — 25000125 ZZHC RX 250: Performed by: OBSTETRICS & GYNECOLOGY

## 2018-07-02 PROCEDURE — 72200001 ZZH LABOR CARE VAGINAL DELIVERY SINGLE

## 2018-07-02 PROCEDURE — 40000084 ZZH STATISTIC IP LACTATION SERVICES 16-30 MIN

## 2018-07-02 PROCEDURE — 25000132 ZZH RX MED GY IP 250 OP 250 PS 637: Performed by: OBSTETRICS & GYNECOLOGY

## 2018-07-02 PROCEDURE — 10907ZC DRAINAGE OF AMNIOTIC FLUID, THERAPEUTIC FROM PRODUCTS OF CONCEPTION, VIA NATURAL OR ARTIFICIAL OPENING: ICD-10-PCS | Performed by: OBSTETRICS & GYNECOLOGY

## 2018-07-02 RX ORDER — NALOXONE HYDROCHLORIDE 0.4 MG/ML
.1-.4 INJECTION, SOLUTION INTRAMUSCULAR; INTRAVENOUS; SUBCUTANEOUS
Status: DISCONTINUED | OUTPATIENT
Start: 2018-07-02 | End: 2018-07-03 | Stop reason: HOSPADM

## 2018-07-02 RX ORDER — LANOLIN 100 %
OINTMENT (GRAM) TOPICAL
Status: DISCONTINUED | OUTPATIENT
Start: 2018-07-02 | End: 2018-07-03 | Stop reason: HOSPADM

## 2018-07-02 RX ORDER — BISACODYL 10 MG
10 SUPPOSITORY, RECTAL RECTAL DAILY PRN
Status: DISCONTINUED | OUTPATIENT
Start: 2018-07-04 | End: 2018-07-03 | Stop reason: HOSPADM

## 2018-07-02 RX ORDER — AMOXICILLIN 250 MG
1 CAPSULE ORAL 2 TIMES DAILY
Status: DISCONTINUED | OUTPATIENT
Start: 2018-07-02 | End: 2018-07-03 | Stop reason: HOSPADM

## 2018-07-02 RX ORDER — IBUPROFEN 800 MG/1
800 TABLET, FILM COATED ORAL EVERY 6 HOURS PRN
Status: DISCONTINUED | OUTPATIENT
Start: 2018-07-02 | End: 2018-07-03 | Stop reason: HOSPADM

## 2018-07-02 RX ORDER — AMOXICILLIN 250 MG
2 CAPSULE ORAL 2 TIMES DAILY
Status: DISCONTINUED | OUTPATIENT
Start: 2018-07-02 | End: 2018-07-03 | Stop reason: HOSPADM

## 2018-07-02 RX ORDER — OXYTOCIN 10 [USP'U]/ML
10 INJECTION, SOLUTION INTRAMUSCULAR; INTRAVENOUS
Status: DISCONTINUED | OUTPATIENT
Start: 2018-07-02 | End: 2018-07-03 | Stop reason: HOSPADM

## 2018-07-02 RX ORDER — MISOPROSTOL 200 UG/1
400 TABLET ORAL
Status: DISCONTINUED | OUTPATIENT
Start: 2018-07-02 | End: 2018-07-03 | Stop reason: HOSPADM

## 2018-07-02 RX ORDER — OXYTOCIN/0.9 % SODIUM CHLORIDE 30/500 ML
100 PLASTIC BAG, INJECTION (ML) INTRAVENOUS CONTINUOUS
Status: DISCONTINUED | OUTPATIENT
Start: 2018-07-02 | End: 2018-07-03 | Stop reason: HOSPADM

## 2018-07-02 RX ORDER — OXYTOCIN/0.9 % SODIUM CHLORIDE 30/500 ML
340 PLASTIC BAG, INJECTION (ML) INTRAVENOUS CONTINUOUS PRN
Status: DISCONTINUED | OUTPATIENT
Start: 2018-07-02 | End: 2018-07-03 | Stop reason: HOSPADM

## 2018-07-02 RX ORDER — ACETAMINOPHEN 325 MG/1
650 TABLET ORAL EVERY 4 HOURS PRN
Status: DISCONTINUED | OUTPATIENT
Start: 2018-07-02 | End: 2018-07-03 | Stop reason: HOSPADM

## 2018-07-02 RX ORDER — HYDROCORTISONE 2.5 %
CREAM (GRAM) TOPICAL 3 TIMES DAILY PRN
Status: DISCONTINUED | OUTPATIENT
Start: 2018-07-02 | End: 2018-07-03 | Stop reason: HOSPADM

## 2018-07-02 RX ADMIN — OXYTOCIN-SODIUM CHLORIDE 0.9% IV SOLN 30 UNIT/500ML 340 ML/HR: 30-0.9/5 SOLUTION at 01:06

## 2018-07-02 RX ADMIN — ACETAMINOPHEN 650 MG: 325 TABLET, FILM COATED ORAL at 11:41

## 2018-07-02 RX ADMIN — OXYCODONE HYDROCHLORIDE AND ACETAMINOPHEN 1 TABLET: 5; 325 TABLET ORAL at 02:34

## 2018-07-02 RX ADMIN — ACETAMINOPHEN 650 MG: 325 TABLET, FILM COATED ORAL at 18:54

## 2018-07-02 RX ADMIN — IBUPROFEN 800 MG: 800 TABLET ORAL at 08:18

## 2018-07-02 RX ADMIN — IBUPROFEN 800 MG: 800 TABLET ORAL at 01:22

## 2018-07-02 RX ADMIN — SENNOSIDES AND DOCUSATE SODIUM 1 TABLET: 8.6; 5 TABLET ORAL at 08:18

## 2018-07-02 NOTE — PLAN OF CARE
Patient refusing oxytocin at this time. Would like to use birthing ball and re-assess at 2030. If no cervical change at that time, patient is open to augmentation with oxytocin at that time.

## 2018-07-02 NOTE — LACTATION NOTE
LC to see patient. She reports wanting to exclusively breastfeed this baby.  Baby was latched at the time of the visit.  Good latch noted with occasional swallows.  Her baby is content while nursing.  All questions answered about colostrum, caloric significance, how to secure a good milk supply, and frequency of breastfeeding sessions.  As soon as LC departed the room, patient called for RN and requested formula.

## 2018-07-02 NOTE — PROVIDER NOTIFICATION
07/01/18 2202   Provider Notification   Provider Name/Title Dr. Cespedes   Method of Notification Phone     MD updated on labor status. MD will head to department in 30-40 minutes.

## 2018-07-02 NOTE — PROGRESS NOTES
"DOD  Feels well, notes upper shoulder soreness from pushing  BP 95/52  Temp 97.9  F (36.6  C) (Oral)  Resp 18  Ht 1.651 m (5' 5\")  Wt 90.7 kg (200 lb)  Breastfeeding? Unknown  BMI 33.28 kg/m2   NAD  Abd: Soft, NT, FF    PPD0 s/p   Heating pad for muscular soreness  Nkechi Montenegro   "

## 2018-07-02 NOTE — PLAN OF CARE
Transferred to unit @ 0340 via WC w/  in arms; FOB and RN accompanying.  Report given to Sallie HARGROVE RN.  Bands verified, orientated to room and call light w/in reach.

## 2018-07-02 NOTE — PROVIDER NOTIFICATION
07/01/18 9037   Provider Notification   Provider Name/Title Dr. Cespedes   Method of Notification Phone   Request Evaluate in Person   Notification Reason SVE   Dr. Cespedes updated on pt SVE of 9/100/-1 with BBOW.  Dr. Cespedes verbalized understanding, on her way to hospital.

## 2018-07-02 NOTE — L&D DELIVERY NOTE
Isaías Ontiveros is a 33 year old [place] female,  4 para 3 with EDC 18, who was admitted for labor at 40 weeks gestation.    Her prenatal care was with Dr. Cespedes. Prenatal course was uncomplicated. Vaginal Group B Streptococcus culture was negative.  The estimated fetal weight was 7.5#.  AROM was performed at 9cm dilation, yielding clear fluid.  Patient did not receive an epidural for pain relief.  The patient achieved complete dilation at 0030. She went on to deliver a vigorous female infant at 0100 by . Apgars were  8 at one minute and 9 at five minutes. There was a loose nuchal cord reduced after delivery. The placenta delivered spontaneously and intact, with a 3VC.  The patient had an intact perineum after delivery.  EBL for the delivery was 150cc.  Janeth Cespedes MD

## 2018-07-02 NOTE — PLAN OF CARE
Problem: Patient Care Overview  Goal: Plan of Care/Patient Progress Review  Outcome: Improving  Stable patient, vitals and postpartum checks are WNL. Pt is up independently and is voiding without difficulty. Breastfeeding is going well, support provided as needed. Pt and spouse oriented to unit/room, security bands verified, pt questions and concerns addressed, bonding well with infant.

## 2018-07-02 NOTE — PROGRESS NOTES
Patient meeting expected goals this shift. Able to do all self cares and cares for . Voiding without difficulty. Bonding well with . Education taught to patient and patient verbalized understanding. Using tylenol and ibuprofen for pain.

## 2018-07-02 NOTE — PROVIDER NOTIFICATION
07/01/18 1944   Provider Notification   Provider Name/Title Dr. Cespedes   Method of Notification Phone     MD updated, minimal cervical change since last exam. Patient feels as though she is leaking fluid, none noted with vaginal exam but possible due to low ELENA, MD aware. UC's irregular, 1-14 minutes apart, palpating mild. FHT's moderate variability with periods of minimal variability, occasional accelerations, intermittent variable decelerations.    Per MD, due to questionable SROM and contraction pattern, plan to augment with oxytocin, orders placed.

## 2018-07-03 VITALS
BODY MASS INDEX: 33.32 KG/M2 | HEIGHT: 65 IN | WEIGHT: 200 LBS | HEART RATE: 69 BPM | SYSTOLIC BLOOD PRESSURE: 101 MMHG | RESPIRATION RATE: 16 BRPM | TEMPERATURE: 98.2 F | DIASTOLIC BLOOD PRESSURE: 59 MMHG

## 2018-07-03 LAB
ABO + RH BLD: NORMAL
ABO + RH BLD: NORMAL
BLOOD BANK CMNT PATIENT-IMP: NORMAL
DATE RH IMM GL GVN: NORMAL
FETAL CELL SCN BLD QL ROSETTE: NORMAL
HGB BLD-MCNC: 11.6 G/DL (ref 11.7–15.7)
RH IG VIALS RECOM PATIENT: NORMAL

## 2018-07-03 PROCEDURE — 86900 BLOOD TYPING SEROLOGIC ABO: CPT | Performed by: OBSTETRICS & GYNECOLOGY

## 2018-07-03 PROCEDURE — 86901 BLOOD TYPING SEROLOGIC RH(D): CPT | Performed by: OBSTETRICS & GYNECOLOGY

## 2018-07-03 PROCEDURE — 36415 COLL VENOUS BLD VENIPUNCTURE: CPT | Performed by: OBSTETRICS & GYNECOLOGY

## 2018-07-03 PROCEDURE — 85461 HEMOGLOBIN FETAL: CPT | Performed by: OBSTETRICS & GYNECOLOGY

## 2018-07-03 PROCEDURE — 85018 HEMOGLOBIN: CPT | Performed by: OBSTETRICS & GYNECOLOGY

## 2018-07-03 PROCEDURE — 25000128 H RX IP 250 OP 636: Performed by: OBSTETRICS & GYNECOLOGY

## 2018-07-03 PROCEDURE — 40000083 ZZH STATISTIC IP LACTATION SERVICES 1-15 MIN

## 2018-07-03 PROCEDURE — 25000132 ZZH RX MED GY IP 250 OP 250 PS 637: Performed by: OBSTETRICS & GYNECOLOGY

## 2018-07-03 RX ADMIN — SENNOSIDES AND DOCUSATE SODIUM 1 TABLET: 8.6; 5 TABLET ORAL at 09:42

## 2018-07-03 RX ADMIN — HUMAN RHO(D) IMMUNE GLOBULIN 300 MCG: 300 INJECTION, SOLUTION INTRAMUSCULAR at 08:36

## 2018-07-03 RX ADMIN — IBUPROFEN 800 MG: 800 TABLET ORAL at 07:20

## 2018-07-03 NOTE — PLAN OF CARE
Problem: Postpartum (Vaginal Delivery) (Adult,Obstetrics,Pediatric)  Goal: Signs and Symptoms of Listed Potential Problems Will be Absent, Minimized or Managed (Postpartum)  Signs and symptoms of listed potential problems will be absent, minimized or managed by discharge/transition of care (reference Postpartum (Vaginal Delivery) (Adult,Obstetrics,Pediatric) CPG).   Outcome: Improving  ind with self and baby cares. Some neck/shouloder pain reported, denies need for pain meds, using hot packs, using ice pack for perineal pain.

## 2018-07-03 NOTE — DISCHARGE INSTRUCTIONS
Postpartum Vaginal Delivery Instructions    Lactation [phone]    [facility] [phone]    Activity       Ask family and friends for help when you need it.    Do not place anything in your vagina for 6 weeks.    You are not restricted on other activities, but take it easy for a few weeks to allow your body to recover from delivery.  You are able to do any activities you feel up to that point.    No driving until you have stopped taking your pain medications (usually two weeks after delivery).     Call your health care provider if you have any of these symptoms:       Increased pain, swelling, redness, or fluid around your stiches from an episiotomy or perineal tear.    A fever above 100.4 F (38 C) with or without chills when placing a thermometer under your tongue.    You soak a sanitary pad with blood within 1 hour, or you see blood clots larger than a golf ball.    Bleeding that lasts more than 6 weeks.    Vaginal discharge that smells bad.    Severe pain, cramping or tenderness in your lower belly area.    A need to urinate more frequently (use the toilet more often), more urgently (use the toilet very quickly), or it burns when you urinate.    Nausea and vomiting.    Redness, swelling or pain around a vein in your leg.    Problems breastfeeding or a red or painful area on your breast.    Chest pain and cough or are gasping for air.    Problems coping with sadness, anxiety, or depression.  If you have any concerns about hurting yourself or the baby, call your provider immediately.     You have questions or concerns after you return home.     Keep your hands clean:  Always wash your hands before touching your perineal area and stitches.  This helps reduce your risk of infection.  If your hands aren't dirty, you may use an alcohol hand-rub to clean your hands. Keep your nails clean and short.

## 2018-07-03 NOTE — LACTATION NOTE
Follow up lactation.  LC reviewed the impacts of formula feeding without breast stimulation and encouraged her to consider pumping to help establish her milk supply.  No other questions noted.

## 2018-07-03 NOTE — PROGRESS NOTES
Data: Vital signs within normal limits. Postpartum checks within normal limits - see flow record. Patient eating and drinking normally. Patient able to empty bladder independently and is up ambulating. No apparent signs of infection.  Patient performing self cares and is able to care for infant.  Action: Patient medicated during the shift for pain with ibuprofen. See MAR. Patient reassessed within 1 hour after each medication and pain was improved - patient stated she was comfortable. Patient education complete and patient verbalized understanding. See flow record.  Response: Positive attachment behaviors observed with infant. Support persons  present.   Plan: Anticipate discharge today at 1225. AVS read to patient and patient verbalized understanding. Patient educated on signs and symptoms of infection, when to call the doctor, and medications. Patient given phone number for lactation and home care. Patient to follow up with clinic in 6 weeks.

## 2018-07-03 NOTE — PLAN OF CARE
Problem: Postpartum (Vaginal Delivery) (Adult,Obstetrics,Pediatric)  Goal: Signs and Symptoms of Listed Potential Problems Will be Absent, Minimized or Managed (Postpartum)  Signs and symptoms of listed potential problems will be absent, minimized or managed by discharge/transition of care (reference Postpartum (Vaginal Delivery) (Adult,Obstetrics,Pediatric) CPG).   Outcome: Improving  Pt is up ad jaimie, and reports adequate pain control. Family is present and supportive. Pt is attentive to infants needs. Breast and bottle feeding infant.  Meeting expected goals.

## 2019-05-02 LAB
HBV SURFACE AG SERPL QL IA: NEGATIVE
HIV 1+2 AB+HIV1 P24 AG SERPL QL IA: NONREACTIVE
RUBELLA ANTIBODY IGG QUANTITATIVE: NORMAL IU/ML

## 2019-11-08 LAB — GROUP B STREP PCR: NEGATIVE

## 2019-11-21 ENCOUNTER — HOSPITAL ENCOUNTER (INPATIENT)
Facility: CLINIC | Age: 34
LOS: 1 days | Discharge: HOME HEALTH | End: 2019-11-22
Attending: OBSTETRICS & GYNECOLOGY | Admitting: OBSTETRICS & GYNECOLOGY
Payer: COMMERCIAL

## 2019-11-21 PROBLEM — Z36.89 ENCOUNTER FOR TRIAGE IN PREGNANT PATIENT: Status: ACTIVE | Noted: 2019-11-21

## 2019-11-21 LAB
ABO + RH BLD: ABNORMAL
ABO + RH BLD: ABNORMAL
BLD GP AB INVEST PLASRBC-IMP: ABNORMAL
BLD GP AB SCN SERPL QL: ABNORMAL
BLOOD BANK CMNT PATIENT-IMP: ABNORMAL
BLOOD BANK CMNT PATIENT-IMP: ABNORMAL
BLOOD BANK CMNT PATIENT-IMP: NORMAL
HGB BLD-MCNC: 14 G/DL (ref 11.7–15.7)
SPECIMEN EXP DATE BLD: ABNORMAL
T PALLIDUM AB SER QL: NONREACTIVE

## 2019-11-21 PROCEDURE — 25000132 ZZH RX MED GY IP 250 OP 250 PS 637: Performed by: OBSTETRICS & GYNECOLOGY

## 2019-11-21 PROCEDURE — 85018 HEMOGLOBIN: CPT | Performed by: OBSTETRICS & GYNECOLOGY

## 2019-11-21 PROCEDURE — 72200001 ZZH LABOR CARE VAGINAL DELIVERY SINGLE

## 2019-11-21 PROCEDURE — 86900 BLOOD TYPING SEROLOGIC ABO: CPT | Performed by: OBSTETRICS & GYNECOLOGY

## 2019-11-21 PROCEDURE — 10907ZC DRAINAGE OF AMNIOTIC FLUID, THERAPEUTIC FROM PRODUCTS OF CONCEPTION, VIA NATURAL OR ARTIFICIAL OPENING: ICD-10-PCS | Performed by: OBSTETRICS & GYNECOLOGY

## 2019-11-21 PROCEDURE — 25000125 ZZHC RX 250: Performed by: OBSTETRICS & GYNECOLOGY

## 2019-11-21 PROCEDURE — 86901 BLOOD TYPING SEROLOGIC RH(D): CPT | Performed by: OBSTETRICS & GYNECOLOGY

## 2019-11-21 PROCEDURE — 86870 RBC ANTIBODY IDENTIFICATION: CPT | Performed by: OBSTETRICS & GYNECOLOGY

## 2019-11-21 PROCEDURE — 86780 TREPONEMA PALLIDUM: CPT | Performed by: OBSTETRICS & GYNECOLOGY

## 2019-11-21 PROCEDURE — 86850 RBC ANTIBODY SCREEN: CPT | Performed by: OBSTETRICS & GYNECOLOGY

## 2019-11-21 PROCEDURE — 12000000 ZZH R&B MED SURG/OB

## 2019-11-21 PROCEDURE — 25000128 H RX IP 250 OP 636: Performed by: OBSTETRICS & GYNECOLOGY

## 2019-11-21 PROCEDURE — 25800030 ZZH RX IP 258 OP 636: Performed by: OBSTETRICS & GYNECOLOGY

## 2019-11-21 RX ORDER — OXYTOCIN 10 [USP'U]/ML
10 INJECTION, SOLUTION INTRAMUSCULAR; INTRAVENOUS
Status: DISCONTINUED | OUTPATIENT
Start: 2019-11-21 | End: 2019-11-22 | Stop reason: HOSPADM

## 2019-11-21 RX ORDER — OXYTOCIN/0.9 % SODIUM CHLORIDE 30/500 ML
100 PLASTIC BAG, INJECTION (ML) INTRAVENOUS CONTINUOUS
Status: DISCONTINUED | OUTPATIENT
Start: 2019-11-21 | End: 2019-11-22 | Stop reason: HOSPADM

## 2019-11-21 RX ORDER — IBUPROFEN 800 MG/1
800 TABLET, FILM COATED ORAL EVERY 6 HOURS PRN
Status: DISCONTINUED | OUTPATIENT
Start: 2019-11-21 | End: 2019-11-22 | Stop reason: HOSPADM

## 2019-11-21 RX ORDER — IBUPROFEN 800 MG/1
800 TABLET, FILM COATED ORAL
Status: COMPLETED | OUTPATIENT
Start: 2019-11-21 | End: 2019-11-21

## 2019-11-21 RX ORDER — VITAMIN B COMPLEX
TABLET ORAL DAILY
COMMUNITY

## 2019-11-21 RX ORDER — AMOXICILLIN 250 MG
1 CAPSULE ORAL 2 TIMES DAILY
Status: DISCONTINUED | OUTPATIENT
Start: 2019-11-21 | End: 2019-11-22 | Stop reason: HOSPADM

## 2019-11-21 RX ORDER — AMOXICILLIN 250 MG
2 CAPSULE ORAL 2 TIMES DAILY
Status: DISCONTINUED | OUTPATIENT
Start: 2019-11-21 | End: 2019-11-22 | Stop reason: HOSPADM

## 2019-11-21 RX ORDER — BISACODYL 10 MG
10 SUPPOSITORY, RECTAL RECTAL DAILY PRN
Status: DISCONTINUED | OUTPATIENT
Start: 2019-11-23 | End: 2019-11-22 | Stop reason: HOSPADM

## 2019-11-21 RX ORDER — NALOXONE HYDROCHLORIDE 0.4 MG/ML
.1-.4 INJECTION, SOLUTION INTRAMUSCULAR; INTRAVENOUS; SUBCUTANEOUS
Status: DISCONTINUED | OUTPATIENT
Start: 2019-11-21 | End: 2019-11-22 | Stop reason: HOSPADM

## 2019-11-21 RX ORDER — ONDANSETRON 2 MG/ML
4 INJECTION INTRAMUSCULAR; INTRAVENOUS EVERY 6 HOURS PRN
Status: DISCONTINUED | OUTPATIENT
Start: 2019-11-21 | End: 2019-11-22 | Stop reason: CLARIF

## 2019-11-21 RX ORDER — OXYTOCIN/0.9 % SODIUM CHLORIDE 30/500 ML
100-340 PLASTIC BAG, INJECTION (ML) INTRAVENOUS CONTINUOUS PRN
Status: DISCONTINUED | OUTPATIENT
Start: 2019-11-21 | End: 2019-11-22 | Stop reason: HOSPADM

## 2019-11-21 RX ORDER — ACETAMINOPHEN 325 MG/1
650 TABLET ORAL EVERY 4 HOURS PRN
Status: DISCONTINUED | OUTPATIENT
Start: 2019-11-21 | End: 2019-11-22 | Stop reason: HOSPADM

## 2019-11-21 RX ORDER — SODIUM CHLORIDE, SODIUM LACTATE, POTASSIUM CHLORIDE, CALCIUM CHLORIDE 600; 310; 30; 20 MG/100ML; MG/100ML; MG/100ML; MG/100ML
INJECTION, SOLUTION INTRAVENOUS CONTINUOUS
Status: DISCONTINUED | OUTPATIENT
Start: 2019-11-21 | End: 2019-11-22 | Stop reason: HOSPADM

## 2019-11-21 RX ORDER — HYDROCORTISONE 2.5 %
CREAM (GRAM) TOPICAL 3 TIMES DAILY PRN
Status: DISCONTINUED | OUTPATIENT
Start: 2019-11-21 | End: 2019-11-22 | Stop reason: HOSPADM

## 2019-11-21 RX ORDER — NALOXONE HYDROCHLORIDE 0.4 MG/ML
.1-.4 INJECTION, SOLUTION INTRAMUSCULAR; INTRAVENOUS; SUBCUTANEOUS
Status: DISCONTINUED | OUTPATIENT
Start: 2019-11-21 | End: 2019-11-22

## 2019-11-21 RX ORDER — UBIDECARENONE 75 MG
100 CAPSULE ORAL DAILY
COMMUNITY

## 2019-11-21 RX ORDER — OXYTOCIN/0.9 % SODIUM CHLORIDE 30/500 ML
340 PLASTIC BAG, INJECTION (ML) INTRAVENOUS CONTINUOUS PRN
Status: DISCONTINUED | OUTPATIENT
Start: 2019-11-21 | End: 2019-11-22 | Stop reason: HOSPADM

## 2019-11-21 RX ORDER — FENTANYL CITRATE 50 UG/ML
50-100 INJECTION, SOLUTION INTRAMUSCULAR; INTRAVENOUS
Status: DISCONTINUED | OUTPATIENT
Start: 2019-11-21 | End: 2019-11-22 | Stop reason: HOSPADM

## 2019-11-21 RX ORDER — METHYLERGONOVINE MALEATE 0.2 MG/ML
200 INJECTION INTRAVENOUS
Status: DISCONTINUED | OUTPATIENT
Start: 2019-11-21 | End: 2019-11-22 | Stop reason: HOSPADM

## 2019-11-21 RX ORDER — LANOLIN 100 %
OINTMENT (GRAM) TOPICAL
Status: DISCONTINUED | OUTPATIENT
Start: 2019-11-21 | End: 2019-11-22 | Stop reason: HOSPADM

## 2019-11-21 RX ORDER — CARBOPROST TROMETHAMINE 250 UG/ML
250 INJECTION, SOLUTION INTRAMUSCULAR
Status: DISCONTINUED | OUTPATIENT
Start: 2019-11-21 | End: 2019-11-22 | Stop reason: HOSPADM

## 2019-11-21 RX ORDER — OXYCODONE AND ACETAMINOPHEN 5; 325 MG/1; MG/1
1 TABLET ORAL
Status: DISCONTINUED | OUTPATIENT
Start: 2019-11-21 | End: 2019-11-22 | Stop reason: HOSPADM

## 2019-11-21 RX ADMIN — ONDANSETRON HYDROCHLORIDE 4 MG: 2 INJECTION, SOLUTION INTRAMUSCULAR; INTRAVENOUS at 08:03

## 2019-11-21 RX ADMIN — SODIUM CHLORIDE, POTASSIUM CHLORIDE, SODIUM LACTATE AND CALCIUM CHLORIDE: 600; 310; 30; 20 INJECTION, SOLUTION INTRAVENOUS at 02:05

## 2019-11-21 RX ADMIN — Medication 340 ML/HR: at 05:51

## 2019-11-21 RX ADMIN — IBUPROFEN 800 MG: 800 TABLET ORAL at 05:42

## 2019-11-21 RX ADMIN — ACETAMINOPHEN 650 MG: 325 TABLET, FILM COATED ORAL at 19:02

## 2019-11-21 RX ADMIN — IBUPROFEN 800 MG: 800 TABLET ORAL at 22:07

## 2019-11-21 ASSESSMENT — ACTIVITIES OF DAILY LIVING (ADL)
RETIRED_COMMUNICATION: 0-->UNDERSTANDS/COMMUNICATES WITHOUT DIFFICULTY
DRESS: 0-->INDEPENDENT
TOILETING: 0-->INDEPENDENT
SWALLOWING: 0-->SWALLOWS FOODS/LIQUIDS WITHOUT DIFFICULTY
COGNITION: 0 - NO COGNITION ISSUES REPORTED
FALL_HISTORY_WITHIN_LAST_SIX_MONTHS: NO
AMBULATION: 0-->INDEPENDENT
TRANSFERRING: 0-->INDEPENDENT
BATHING: 0-->INDEPENDENT
RETIRED_EATING: 0-->INDEPENDENT

## 2019-11-21 ASSESSMENT — MIFFLIN-ST. JEOR: SCORE: 1657.51

## 2019-11-21 NOTE — PROGRESS NOTES
LABOR PROGRESS NOTE  Vitals were reviewed  spontaneous    Cervical Exam /-1 EFW 7 #  Artificialclear fluid  Uterine activity:frequency q 3 minutes  Fetal Status:Tier 1 (normal)    Impression: Labor  Plan: Anticipate

## 2019-11-21 NOTE — PROVIDER NOTIFICATION
11/21/19 0355   Provider Notification   Provider Name/Title dr thurman   Method of Notification At Bedside   Request Evaluate in Person   Notification Reason SVE;Status Update       Dr thurman at bedside per RN request.  Patient continues to be 9cm and feeling intermittant pressure.  SVE per provider and md states to try the peanut ball and position changes to help infant rotate.  Informed MD that contractions are every 3-5 minutes and infant is hard to monitor at times.  Noting occasional variable decelerations and occasional early decelerations.  Periods of minimal variability and moderate variability.  Will continue to monitor and watch

## 2019-11-21 NOTE — PLAN OF CARE
Data: Isaías Ontiveros transferred to 430 via wheelchair at 0815. Baby transferred via parent's arms.  Action: Receiving unit notified of transfer: Yes. Patient and family notified of room change. Report given to MELINA Gambino at 0815. Belongings sent to receiving unit. Accompanied by Registered Nurse. Oriented patient to surroundings. Call light within reach. ID bands double-checked with receiving RN.  Response: Patient tolerated transfer and is stable.    Patients mobililty level scored using the bedside mobility assistance tool (BMAT). Patient is at a mobility level test number: 4. Mobility equipment used: none required. Required assist of 0 staff members. Further use of BMAT scoring not required.

## 2019-11-21 NOTE — PROGRESS NOTES
LABOR PROGRESS NOTE  Vitals were reviewed  spontaneous    Cervical Exam 9\90\-1  OT ?brow  Artificialclear fluid  Uterine activity:frequency q 3 minutes  Fetal Status:Tier 1 (normal)    Impression: OT possible partial brow  Plan: position changes Anticipate

## 2019-11-21 NOTE — L&D DELIVERY NOTE
Delivery Date:  2019      PROCEDURE:  Spontaneous vaginal delivery.      DETAILS:  This patient is a 34-year-old  5, now para 5, who presented to Labor and Delivery at 38-5/7 weeks' gestation with spontaneous onset of labor.  She had amniotomy for clear fluids.  She was initially found to have advanced cervical dilatation, being 8-9 cm dilated on arrival.  She remained 9 cm dilated for approximately 4 hours with an occiput transverse presentation with a partial brow.  The patient had a number of position changes which converted to the occiput anterior presentation and a single push effected delivery.  There was a very tight nuchal cord, which was reduced.  The body delivered without difficulty.  The baby had initial poor respiratory efforts and delayed cord clamping was abandoned.  The cord was clamped and cut.  The baby was taken to the isolette where the baby was warmed, had blow-by oxygen, and then the baby rapidly responded.  Apgars were assigned at 6 and 7.  The placenta delivered spontaneously intact.  Vagina and cervix revealed no lacerations.  There was minimal blood loss, approximately 100 mL.  The uterus involuted well.  This vigorous female infant and mother were recovering well together in the labor room upon my departure.         SHAWNA PEACOCK MD             D: 2019   T: 2019   MT: MARTINEZ      Name:     BARBI CUENCA   MRN:      [id number]        Account:        [id number]   :      1985        Delivery Date:  2019               Document: [id number]

## 2019-11-21 NOTE — H&P
The patient's history and physical have been reviewed, and the patient was examined. There has been no interval change in condition.    Armani Sylvester MD

## 2019-11-21 NOTE — PROVIDER NOTIFICATION
11/21/19 0527   Provider Notification   Provider Name/Title Dr Sylvester   Method of Notification At Bedside   Request Attend Delivery   Notification Reason SVE;Status Update     Dr Sylvester at bedside.  Informed of SVE of 9 cm, fetal head has rotated and patient is bearing down.  SVE shows complete and starting to push.

## 2019-11-21 NOTE — PLAN OF CARE
Data: Vital signs within normal limits. Postpartum checks within normal limits - see flow record. Patient eating and drinking normally. Patient able to empty bladder independently and is up ambulating. No apparent signs of infection. Patient performing self cares and is able to care for infant.  Action: Patient medicated during the shift for cramping. See MAR. Patient reassessed within 1 hour after each medication and pain was improved - patient stated she was comfortable. Patient education done about infant and self cares. See flow record.  Response: Positive attachment behaviors observed with infant. Support persons were present.   Plan: Anticipate discharge on 11/23.

## 2019-11-22 VITALS
RESPIRATION RATE: 16 BRPM | BODY MASS INDEX: 35.32 KG/M2 | TEMPERATURE: 98.3 F | DIASTOLIC BLOOD PRESSURE: 65 MMHG | OXYGEN SATURATION: 100 % | SYSTOLIC BLOOD PRESSURE: 96 MMHG | HEART RATE: 76 BPM | WEIGHT: 212 LBS | HEIGHT: 65 IN

## 2019-11-22 LAB
ABO + RH BLD: NORMAL
ABO + RH BLD: NORMAL
BLOOD BANK CMNT PATIENT-IMP: NORMAL
DATE RH IMM GL GVN: NORMAL
FETAL CELL SCN BLD QL ROSETTE: NORMAL
HGB BLD-MCNC: 12 G/DL (ref 11.7–15.7)
RH IG VIALS RECOM PATIENT: NORMAL

## 2019-11-22 PROCEDURE — 86901 BLOOD TYPING SEROLOGIC RH(D): CPT | Performed by: OBSTETRICS & GYNECOLOGY

## 2019-11-22 PROCEDURE — 25000132 ZZH RX MED GY IP 250 OP 250 PS 637: Performed by: OBSTETRICS & GYNECOLOGY

## 2019-11-22 PROCEDURE — 85018 HEMOGLOBIN: CPT | Performed by: OBSTETRICS & GYNECOLOGY

## 2019-11-22 PROCEDURE — 85461 HEMOGLOBIN FETAL: CPT | Performed by: OBSTETRICS & GYNECOLOGY

## 2019-11-22 PROCEDURE — 86900 BLOOD TYPING SEROLOGIC ABO: CPT | Performed by: OBSTETRICS & GYNECOLOGY

## 2019-11-22 PROCEDURE — 36415 COLL VENOUS BLD VENIPUNCTURE: CPT | Performed by: OBSTETRICS & GYNECOLOGY

## 2019-11-22 PROCEDURE — 25000128 H RX IP 250 OP 636: Performed by: OBSTETRICS & GYNECOLOGY

## 2019-11-22 RX ORDER — ONDANSETRON 4 MG/1
4 TABLET, ORALLY DISINTEGRATING ORAL EVERY 6 HOURS PRN
Status: DISCONTINUED | OUTPATIENT
Start: 2019-11-22 | End: 2019-11-22 | Stop reason: HOSPADM

## 2019-11-22 RX ORDER — AMOXICILLIN 250 MG
1-2 CAPSULE ORAL 2 TIMES DAILY PRN
Qty: 40 TABLET | Refills: 0 | Status: ON HOLD | OUTPATIENT
Start: 2019-11-22 | End: 2021-01-15

## 2019-11-22 RX ORDER — IBUPROFEN 600 MG/1
600 TABLET, FILM COATED ORAL EVERY 6 HOURS PRN
Qty: 30 TABLET | Refills: 0 | Status: ON HOLD | OUTPATIENT
Start: 2019-11-22 | End: 2021-01-15

## 2019-11-22 RX ADMIN — IBUPROFEN 800 MG: 800 TABLET ORAL at 04:58

## 2019-11-22 RX ADMIN — IBUPROFEN 800 MG: 800 TABLET ORAL at 12:09

## 2019-11-22 RX ADMIN — SENNOSIDES AND DOCUSATE SODIUM 2 TABLET: 8.6; 5 TABLET ORAL at 08:53

## 2019-11-22 RX ADMIN — ONDANSETRON 4 MG: 4 TABLET, ORALLY DISINTEGRATING ORAL at 13:34

## 2019-11-22 RX ADMIN — HUMAN RHO(D) IMMUNE GLOBULIN 300 MCG: 300 INJECTION, SOLUTION INTRAMUSCULAR at 09:43

## 2019-11-22 NOTE — PLAN OF CARE
VSS. FF but deviated to the left. Bleeding minimal. Patient up adlib and voiding without difficulty. Encouraged patient to void frequently.  Pain managed with ibuprofen. Breastfeeding well and bottle feeding. Bonding well with infant and independent with cares.

## 2019-11-22 NOTE — PROGRESS NOTES
Patient unavailable as was sleeping when Public Health Nurse (PHN) stopped by to discuss [facility] resources.

## 2019-11-22 NOTE — PLAN OF CARE
Patient meeting expected goals. Bonding well with . Had slight headache at beginning of shift, resolved with tylenol. Patient up to shower this shift. Education taught to patient and patient verbalized understanding.

## 2019-11-22 NOTE — PROGRESS NOTES
"PPD1  Feels well, no concerns  /70   Pulse 79   Temp 97.6  F (36.4  C) (Oral)   Resp 16   Ht 1.651 m (5' 5\")   Wt 96.2 kg (212 lb)   SpO2 100%   Breastfeeding Unknown   BMI 35.28 kg/m     NAD  Abd Soft, NT, FF  Ext tr edema, NT    PPD1 s/p   Desires discharge today  F/u 6 wks  Nkechi Montenegro MD   "

## 2019-11-22 NOTE — DISCHARGE INSTRUCTIONS
Make appointment to be seen by your provider in 6 weeks    Lactation: [phone]  [facility] Care: [phone]    Postpartum Vaginal Delivery Instructions    Activity       Ask family and friends for help when you need it.    Do not place anything in your vagina for 6 weeks.    You are not restricted on other activities, but take it easy for a few weeks to allow your body to recover from delivery.  You are able to do any activities you feel up to that point.    No driving until you have stopped taking your pain medications (usually two weeks after delivery).     Call your health care provider if you have any of these symptoms:       Increased pain, swelling, redness, or fluid around your stiches from an episiotomy or perineal tear.    A fever above 100.4 F (38 C) with or without chills when placing a thermometer under your tongue.    You soak a sanitary pad with blood within 1 hour, or you see blood clots larger than a golf ball.    Bleeding that lasts more than 6 weeks.    Vaginal discharge that smells bad.    Severe pain, cramping or tenderness in your lower belly area.    A need to urinate more frequently (use the toilet more often), more urgently (use the toilet very quickly), or it burns when you urinate.    Nausea and vomiting.    Redness, swelling or pain around a vein in your leg.    Problems breastfeeding or a red or painful area on your breast.    Chest pain and cough or are gasping for air.    Problems coping with sadness, anxiety, or depression.  If you have any concerns about hurting yourself or the baby, call your provider immediately.     You have questions or concerns after you return home.     Keep your hands clean:  Always wash your hands before touching your perineal area and stitches.  This helps reduce your risk of infection.  If your hands aren't dirty, you may use an alcohol hand-rub to clean your hands. Keep your nails clean and short.

## 2019-11-22 NOTE — PLAN OF CARE
Pt. VSS. Pain managed with PRN ibuprofen and acetaminophen. Independent in infant and personal cares. Breast and formula feeding infant. Attentive to infant needs and bonding well with infant. Discharge instructions provided and questions answered by RN. Discharge medications given to patient. Follow up recommendations discussed. Discharged home with infant @ 8606.

## 2019-11-22 NOTE — PROVIDER NOTIFICATION
Dr. POONAM Montenegro was paged to get current labs on patient. Per Dr. Montenegro patient is rubella immune, hep B and HIV negative.

## 2020-08-12 ENCOUNTER — MEDICAL CORRESPONDENCE (OUTPATIENT)
Dept: HEALTH INFORMATION MANAGEMENT | Facility: CLINIC | Age: 35
End: 2020-08-12

## 2020-08-12 ENCOUNTER — TRANSCRIBE ORDERS (OUTPATIENT)
Dept: MATERNAL FETAL MEDICINE | Facility: CLINIC | Age: 35
End: 2020-08-12

## 2020-08-12 ENCOUNTER — TRANSFERRED RECORDS (OUTPATIENT)
Dept: HEALTH INFORMATION MANAGEMENT | Facility: CLINIC | Age: 35
End: 2020-08-12

## 2020-08-12 DIAGNOSIS — O26.90 PREGNANCY RELATED CONDITION, ANTEPARTUM: Primary | ICD-10-CM

## 2020-08-12 LAB
HBV SURFACE AG SERPL QL IA: NEGATIVE
HIV 1+2 AB+HIV1 P24 AG SERPL QL IA: NEGATIVE
RUBELLA ANTIBODY IGG QUANTITATIVE: NORMAL IU/ML

## 2020-08-13 ENCOUNTER — PRE VISIT (OUTPATIENT)
Dept: MATERNAL FETAL MEDICINE | Facility: CLINIC | Age: 35
End: 2020-08-13

## 2020-12-14 LAB — GROUP B STREP PCR: NEGATIVE

## 2021-01-15 ENCOUNTER — HOSPITAL ENCOUNTER (INPATIENT)
Facility: CLINIC | Age: 36
LOS: 2 days | Discharge: HOME | End: 2021-01-17
Attending: OBSTETRICS & GYNECOLOGY | Admitting: OBSTETRICS & GYNECOLOGY
Payer: COMMERCIAL

## 2021-01-15 ENCOUNTER — ANESTHESIA EVENT (OUTPATIENT)
Dept: OBGYN | Facility: CLINIC | Age: 36
End: 2021-01-15
Payer: COMMERCIAL

## 2021-01-15 ENCOUNTER — ANESTHESIA (OUTPATIENT)
Dept: OBGYN | Facility: CLINIC | Age: 36
End: 2021-01-15
Payer: COMMERCIAL

## 2021-01-15 LAB
ABO + RH BLD: ABNORMAL
ABO + RH BLD: ABNORMAL
AMPHETAMINES UR QL SCN: NEGATIVE
BLD GP AB INVEST PLASRBC-IMP: ABNORMAL
BLD GP AB SCN SERPL QL: ABNORMAL
BLOOD BANK CMNT PATIENT-IMP: ABNORMAL
BLOOD BANK CMNT PATIENT-IMP: NORMAL
CANNABINOIDS UR QL: NEGATIVE
COCAINE UR QL: NEGATIVE
HGB BLD-MCNC: 13.2 G/DL (ref 11.7–15.7)
LABORATORY COMMENT REPORT: NORMAL
OPIATES UR QL SCN: NEGATIVE
PCP UR QL SCN: NEGATIVE
SARS-COV-2 RNA RESP QL NAA+PROBE: NEGATIVE
SPECIMEN EXP DATE BLD: ABNORMAL
SPECIMEN SOURCE: NORMAL
T PALLIDUM AB SER QL: NONREACTIVE

## 2021-01-15 PROCEDURE — 04QF0ZZ REPAIR LEFT INTERNAL ILIAC ARTERY, OPEN APPROACH: ICD-10-PCS | Performed by: OBSTETRICS & GYNECOLOGY

## 2021-01-15 PROCEDURE — 87635 SARS-COV-2 COVID-19 AMP PRB: CPT | Performed by: OBSTETRICS & GYNECOLOGY

## 2021-01-15 PROCEDURE — 250N000013 HC RX MED GY IP 250 OP 250 PS 637: Performed by: OBSTETRICS & GYNECOLOGY

## 2021-01-15 PROCEDURE — 86780 TREPONEMA PALLIDUM: CPT | Performed by: OBSTETRICS & GYNECOLOGY

## 2021-01-15 PROCEDURE — 250N000011 HC RX IP 250 OP 636: Performed by: OBSTETRICS & GYNECOLOGY

## 2021-01-15 PROCEDURE — 250N000011 HC RX IP 250 OP 636: Performed by: ANESTHESIOLOGY

## 2021-01-15 PROCEDURE — 258N000003 HC RX IP 258 OP 636: Performed by: OBSTETRICS & GYNECOLOGY

## 2021-01-15 PROCEDURE — 272N000001 HC OR GENERAL SUPPLY STERILE: Performed by: OBSTETRICS & GYNECOLOGY

## 2021-01-15 PROCEDURE — 88307 TISSUE EXAM BY PATHOLOGIST: CPT | Mod: TC | Performed by: OBSTETRICS & GYNECOLOGY

## 2021-01-15 PROCEDURE — 710N000009 HC RECOVERY PHASE 1, LEVEL 1, PER MIN: Performed by: OBSTETRICS & GYNECOLOGY

## 2021-01-15 PROCEDURE — 86901 BLOOD TYPING SEROLOGIC RH(D): CPT | Performed by: OBSTETRICS & GYNECOLOGY

## 2021-01-15 PROCEDURE — 86900 BLOOD TYPING SEROLOGIC ABO: CPT | Performed by: OBSTETRICS & GYNECOLOGY

## 2021-01-15 PROCEDURE — 360N000076 HC SURGERY LEVEL 3, PER MIN: Performed by: OBSTETRICS & GYNECOLOGY

## 2021-01-15 PROCEDURE — 250N000011 HC RX IP 250 OP 636: Performed by: NURSE ANESTHETIST, CERTIFIED REGISTERED

## 2021-01-15 PROCEDURE — 250N000009 HC RX 250: Performed by: ANESTHESIOLOGY

## 2021-01-15 PROCEDURE — 80307 DRUG TEST PRSMV CHEM ANLYZR: CPT | Performed by: OBSTETRICS & GYNECOLOGY

## 2021-01-15 PROCEDURE — 250N000009 HC RX 250: Performed by: NURSE ANESTHETIST, CERTIFIED REGISTERED

## 2021-01-15 PROCEDURE — 86850 RBC ANTIBODY SCREEN: CPT | Performed by: OBSTETRICS & GYNECOLOGY

## 2021-01-15 PROCEDURE — 120N000001 HC R&B MED SURG/OB

## 2021-01-15 PROCEDURE — 36415 COLL VENOUS BLD VENIPUNCTURE: CPT | Performed by: OBSTETRICS & GYNECOLOGY

## 2021-01-15 PROCEDURE — 250N000009 HC RX 250: Performed by: OBSTETRICS & GYNECOLOGY

## 2021-01-15 PROCEDURE — 370N000017 HC ANESTHESIA TECHNICAL FEE, PER MIN: Performed by: OBSTETRICS & GYNECOLOGY

## 2021-01-15 PROCEDURE — 86870 RBC ANTIBODY IDENTIFICATION: CPT | Performed by: OBSTETRICS & GYNECOLOGY

## 2021-01-15 PROCEDURE — 85018 HEMOGLOBIN: CPT | Performed by: OBSTETRICS & GYNECOLOGY

## 2021-01-15 PROCEDURE — 10907ZC DRAINAGE OF AMNIOTIC FLUID, THERAPEUTIC FROM PRODUCTS OF CONCEPTION, VIA NATURAL OR ARTIFICIAL OPENING: ICD-10-PCS | Performed by: OBSTETRICS & GYNECOLOGY

## 2021-01-15 PROCEDURE — 88307 TISSUE EXAM BY PATHOLOGIST: CPT | Mod: 26 | Performed by: PATHOLOGY

## 2021-01-15 RX ORDER — TRANEXAMIC ACID 10 MG/ML
1 INJECTION, SOLUTION INTRAVENOUS EVERY 30 MIN PRN
Status: DISCONTINUED | OUTPATIENT
Start: 2021-01-15 | End: 2021-01-17 | Stop reason: HOSPADM

## 2021-01-15 RX ORDER — OXYTOCIN/0.9 % SODIUM CHLORIDE 30/500 ML
PLASTIC BAG, INJECTION (ML) INTRAVENOUS PRN
Status: DISCONTINUED | OUTPATIENT
Start: 2021-01-15 | End: 2021-01-15

## 2021-01-15 RX ORDER — DIPHENHYDRAMINE HYDROCHLORIDE 50 MG/ML
25 INJECTION INTRAMUSCULAR; INTRAVENOUS EVERY 6 HOURS PRN
Status: DISCONTINUED | OUTPATIENT
Start: 2021-01-15 | End: 2021-01-17 | Stop reason: HOSPADM

## 2021-01-15 RX ORDER — OXYTOCIN/0.9 % SODIUM CHLORIDE 30/500 ML
100 PLASTIC BAG, INJECTION (ML) INTRAVENOUS CONTINUOUS
Status: DISCONTINUED | OUTPATIENT
Start: 2021-01-15 | End: 2021-01-17 | Stop reason: HOSPADM

## 2021-01-15 RX ORDER — HYDROCORTISONE 2.5 %
CREAM (GRAM) TOPICAL 3 TIMES DAILY PRN
Status: DISCONTINUED | OUTPATIENT
Start: 2021-01-15 | End: 2021-01-17 | Stop reason: HOSPADM

## 2021-01-15 RX ORDER — OXYTOCIN 10 [USP'U]/ML
10 INJECTION, SOLUTION INTRAMUSCULAR; INTRAVENOUS
Status: DISCONTINUED | OUTPATIENT
Start: 2021-01-15 | End: 2021-01-17 | Stop reason: HOSPADM

## 2021-01-15 RX ORDER — MODIFIED LANOLIN
OINTMENT (GRAM) TOPICAL
Status: DISCONTINUED | OUTPATIENT
Start: 2021-01-15 | End: 2021-01-17 | Stop reason: HOSPADM

## 2021-01-15 RX ORDER — NALOXONE HYDROCHLORIDE 0.4 MG/ML
0.2 INJECTION, SOLUTION INTRAMUSCULAR; INTRAVENOUS; SUBCUTANEOUS
Status: DISCONTINUED | OUTPATIENT
Start: 2021-01-15 | End: 2021-01-17 | Stop reason: HOSPADM

## 2021-01-15 RX ORDER — OXYTOCIN/0.9 % SODIUM CHLORIDE 30/500 ML
100-340 PLASTIC BAG, INJECTION (ML) INTRAVENOUS CONTINUOUS PRN
Status: DISCONTINUED | OUTPATIENT
Start: 2021-01-15 | End: 2021-01-15

## 2021-01-15 RX ORDER — ACETAMINOPHEN 325 MG/1
650 TABLET ORAL EVERY 4 HOURS PRN
Status: DISCONTINUED | OUTPATIENT
Start: 2021-01-15 | End: 2021-01-15

## 2021-01-15 RX ORDER — EPHEDRINE SULFATE 50 MG/ML
5 INJECTION, SOLUTION INTRAMUSCULAR; INTRAVENOUS; SUBCUTANEOUS
Status: DISCONTINUED | OUTPATIENT
Start: 2021-01-15 | End: 2021-01-15

## 2021-01-15 RX ORDER — DEXTROSE, SODIUM CHLORIDE, SODIUM LACTATE, POTASSIUM CHLORIDE, AND CALCIUM CHLORIDE 5; .6; .31; .03; .02 G/100ML; G/100ML; G/100ML; G/100ML; G/100ML
INJECTION, SOLUTION INTRAVENOUS CONTINUOUS
Status: DISCONTINUED | OUTPATIENT
Start: 2021-01-15 | End: 2021-01-17 | Stop reason: HOSPADM

## 2021-01-15 RX ORDER — OXYCODONE HYDROCHLORIDE 5 MG/1
5 TABLET ORAL EVERY 4 HOURS PRN
Status: DISCONTINUED | OUTPATIENT
Start: 2021-01-15 | End: 2021-01-17 | Stop reason: HOSPADM

## 2021-01-15 RX ORDER — BISACODYL 10 MG
10 SUPPOSITORY, RECTAL RECTAL DAILY PRN
Status: DISCONTINUED | OUTPATIENT
Start: 2021-01-17 | End: 2021-01-17 | Stop reason: HOSPADM

## 2021-01-15 RX ORDER — SIMETHICONE 80 MG
80 TABLET,CHEWABLE ORAL 4 TIMES DAILY PRN
Status: DISCONTINUED | OUTPATIENT
Start: 2021-01-15 | End: 2021-01-17 | Stop reason: HOSPADM

## 2021-01-15 RX ORDER — ONDANSETRON 2 MG/ML
4 INJECTION INTRAMUSCULAR; INTRAVENOUS EVERY 30 MIN PRN
Status: DISCONTINUED | OUTPATIENT
Start: 2021-01-15 | End: 2021-01-15 | Stop reason: HOSPADM

## 2021-01-15 RX ORDER — CARBOPROST TROMETHAMINE 250 UG/ML
250 INJECTION, SOLUTION INTRAMUSCULAR
Status: DISCONTINUED | OUTPATIENT
Start: 2021-01-15 | End: 2021-01-15

## 2021-01-15 RX ORDER — AMOXICILLIN 250 MG
2 CAPSULE ORAL 2 TIMES DAILY
Status: DISCONTINUED | OUTPATIENT
Start: 2021-01-15 | End: 2021-01-17 | Stop reason: HOSPADM

## 2021-01-15 RX ORDER — NALOXONE HYDROCHLORIDE 0.4 MG/ML
0.4 INJECTION, SOLUTION INTRAMUSCULAR; INTRAVENOUS; SUBCUTANEOUS
Status: DISCONTINUED | OUTPATIENT
Start: 2021-01-15 | End: 2021-01-16

## 2021-01-15 RX ORDER — MAGNESIUM HYDROXIDE 1200 MG/15ML
LIQUID ORAL PRN
Status: DISCONTINUED | OUTPATIENT
Start: 2021-01-15 | End: 2021-01-17 | Stop reason: HOSPADM

## 2021-01-15 RX ORDER — NALOXONE HYDROCHLORIDE 0.4 MG/ML
0.4 INJECTION, SOLUTION INTRAMUSCULAR; INTRAVENOUS; SUBCUTANEOUS
Status: DISCONTINUED | OUTPATIENT
Start: 2021-01-15 | End: 2021-01-17 | Stop reason: HOSPADM

## 2021-01-15 RX ORDER — CEFAZOLIN SODIUM 1 G/3ML
1 INJECTION, POWDER, FOR SOLUTION INTRAMUSCULAR; INTRAVENOUS SEE ADMIN INSTRUCTIONS
Status: DISCONTINUED | OUTPATIENT
Start: 2021-01-15 | End: 2021-01-15

## 2021-01-15 RX ORDER — AMOXICILLIN 250 MG
1 CAPSULE ORAL 2 TIMES DAILY
Status: DISCONTINUED | OUTPATIENT
Start: 2021-01-15 | End: 2021-01-17 | Stop reason: HOSPADM

## 2021-01-15 RX ORDER — IBUPROFEN 800 MG/1
800 TABLET, FILM COATED ORAL
Status: DISCONTINUED | OUTPATIENT
Start: 2021-01-15 | End: 2021-01-15

## 2021-01-15 RX ORDER — ONDANSETRON 2 MG/ML
4 INJECTION INTRAMUSCULAR; INTRAVENOUS EVERY 6 HOURS PRN
Status: DISCONTINUED | OUTPATIENT
Start: 2021-01-15 | End: 2021-01-17 | Stop reason: HOSPADM

## 2021-01-15 RX ORDER — NALOXONE HYDROCHLORIDE 0.4 MG/ML
0.4 INJECTION, SOLUTION INTRAMUSCULAR; INTRAVENOUS; SUBCUTANEOUS
Status: DISCONTINUED | OUTPATIENT
Start: 2021-01-15 | End: 2021-01-15

## 2021-01-15 RX ORDER — ACETAMINOPHEN 325 MG/1
975 TABLET ORAL EVERY 6 HOURS
Status: DISCONTINUED | OUTPATIENT
Start: 2021-01-15 | End: 2021-01-17 | Stop reason: HOSPADM

## 2021-01-15 RX ORDER — IBUPROFEN 800 MG/1
800 TABLET, FILM COATED ORAL EVERY 6 HOURS
Status: DISCONTINUED | OUTPATIENT
Start: 2021-01-16 | End: 2021-01-17 | Stop reason: HOSPADM

## 2021-01-15 RX ORDER — SODIUM CHLORIDE, SODIUM LACTATE, POTASSIUM CHLORIDE, CALCIUM CHLORIDE 600; 310; 30; 20 MG/100ML; MG/100ML; MG/100ML; MG/100ML
INJECTION, SOLUTION INTRAVENOUS CONTINUOUS
Status: DISCONTINUED | OUTPATIENT
Start: 2021-01-15 | End: 2021-01-15 | Stop reason: HOSPADM

## 2021-01-15 RX ORDER — LIDOCAINE 40 MG/G
CREAM TOPICAL
Status: DISCONTINUED | OUTPATIENT
Start: 2021-01-15 | End: 2021-01-16

## 2021-01-15 RX ORDER — FENTANYL CITRATE-0.9 % NACL/PF 10 MCG/ML
PLASTIC BAG, INJECTION (ML) INTRAVENOUS CONTINUOUS PRN
Status: DISCONTINUED | OUTPATIENT
Start: 2021-01-15 | End: 2021-01-15

## 2021-01-15 RX ORDER — NALOXONE HYDROCHLORIDE 0.4 MG/ML
0.2 INJECTION, SOLUTION INTRAMUSCULAR; INTRAVENOUS; SUBCUTANEOUS
Status: DISCONTINUED | OUTPATIENT
Start: 2021-01-15 | End: 2021-01-16

## 2021-01-15 RX ORDER — ONDANSETRON 2 MG/ML
4 INJECTION INTRAMUSCULAR; INTRAVENOUS EVERY 6 HOURS PRN
Status: DISCONTINUED | OUTPATIENT
Start: 2021-01-15 | End: 2021-01-15

## 2021-01-15 RX ORDER — SODIUM CHLORIDE, SODIUM LACTATE, POTASSIUM CHLORIDE, CALCIUM CHLORIDE 600; 310; 30; 20 MG/100ML; MG/100ML; MG/100ML; MG/100ML
INJECTION, SOLUTION INTRAVENOUS CONTINUOUS
Status: DISCONTINUED | OUTPATIENT
Start: 2021-01-15 | End: 2021-01-15

## 2021-01-15 RX ORDER — SCOLOPAMINE TRANSDERMAL SYSTEM 1 MG/1
1 PATCH, EXTENDED RELEASE TRANSDERMAL ONCE
Status: COMPLETED | OUTPATIENT
Start: 2021-01-15 | End: 2021-01-16

## 2021-01-15 RX ORDER — PRENATAL VIT/IRON FUM/FOLIC AC 27MG-0.8MG
1 TABLET ORAL DAILY
Status: DISCONTINUED | OUTPATIENT
Start: 2021-01-15 | End: 2021-01-17 | Stop reason: HOSPADM

## 2021-01-15 RX ORDER — OXYTOCIN/0.9 % SODIUM CHLORIDE 30/500 ML
340 PLASTIC BAG, INJECTION (ML) INTRAVENOUS CONTINUOUS PRN
Status: DISCONTINUED | OUTPATIENT
Start: 2021-01-15 | End: 2021-01-17 | Stop reason: HOSPADM

## 2021-01-15 RX ORDER — AZITHROMYCIN 500 MG/5ML
500 INJECTION, POWDER, LYOPHILIZED, FOR SOLUTION INTRAVENOUS
Status: COMPLETED | OUTPATIENT
Start: 2021-01-15 | End: 2021-01-15

## 2021-01-15 RX ORDER — TRANEXAMIC ACID 10 MG/ML
1 INJECTION, SOLUTION INTRAVENOUS EVERY 30 MIN PRN
Status: DISCONTINUED | OUTPATIENT
Start: 2021-01-15 | End: 2021-01-15

## 2021-01-15 RX ORDER — OXYTOCIN 10 [USP'U]/ML
10 INJECTION, SOLUTION INTRAMUSCULAR; INTRAVENOUS
Status: DISCONTINUED | OUTPATIENT
Start: 2021-01-15 | End: 2021-01-15

## 2021-01-15 RX ORDER — NALOXONE HYDROCHLORIDE 0.4 MG/ML
0.2 INJECTION, SOLUTION INTRAMUSCULAR; INTRAVENOUS; SUBCUTANEOUS
Status: DISCONTINUED | OUTPATIENT
Start: 2021-01-15 | End: 2021-01-15

## 2021-01-15 RX ORDER — FENTANYL CITRATE 50 UG/ML
50-100 INJECTION, SOLUTION INTRAMUSCULAR; INTRAVENOUS
Status: DISCONTINUED | OUTPATIENT
Start: 2021-01-15 | End: 2021-01-15

## 2021-01-15 RX ORDER — NALBUPHINE HYDROCHLORIDE 10 MG/ML
2.5-5 INJECTION, SOLUTION INTRAMUSCULAR; INTRAVENOUS; SUBCUTANEOUS EVERY 6 HOURS PRN
Status: DISCONTINUED | OUTPATIENT
Start: 2021-01-15 | End: 2021-01-17 | Stop reason: HOSPADM

## 2021-01-15 RX ORDER — KETOROLAC TROMETHAMINE 30 MG/ML
30 INJECTION, SOLUTION INTRAMUSCULAR; INTRAVENOUS EVERY 6 HOURS
Status: COMPLETED | OUTPATIENT
Start: 2021-01-16 | End: 2021-01-16

## 2021-01-15 RX ORDER — DIPHENHYDRAMINE HCL 25 MG
25 CAPSULE ORAL EVERY 6 HOURS PRN
Status: DISCONTINUED | OUTPATIENT
Start: 2021-01-15 | End: 2021-01-17 | Stop reason: HOSPADM

## 2021-01-15 RX ORDER — METHYLERGONOVINE MALEATE 0.2 MG/ML
200 INJECTION INTRAVENOUS
Status: DISCONTINUED | OUTPATIENT
Start: 2021-01-15 | End: 2021-01-15

## 2021-01-15 RX ORDER — CITRIC ACID/SODIUM CITRATE 334-500MG
30 SOLUTION, ORAL ORAL
Status: COMPLETED | OUTPATIENT
Start: 2021-01-15 | End: 2021-01-15

## 2021-01-15 RX ORDER — OXYTOCIN/0.9 % SODIUM CHLORIDE 30/500 ML
1-24 PLASTIC BAG, INJECTION (ML) INTRAVENOUS CONTINUOUS
Status: DISCONTINUED | OUTPATIENT
Start: 2021-01-15 | End: 2021-01-15

## 2021-01-15 RX ORDER — LIDOCAINE 40 MG/G
CREAM TOPICAL
Status: DISCONTINUED | OUTPATIENT
Start: 2021-01-15 | End: 2021-01-17 | Stop reason: HOSPADM

## 2021-01-15 RX ORDER — DEXAMETHASONE SODIUM PHOSPHATE 4 MG/ML
INJECTION, SOLUTION INTRA-ARTICULAR; INTRALESIONAL; INTRAMUSCULAR; INTRAVENOUS; SOFT TISSUE PRN
Status: DISCONTINUED | OUTPATIENT
Start: 2021-01-15 | End: 2021-01-15

## 2021-01-15 RX ORDER — OXYCODONE AND ACETAMINOPHEN 5; 325 MG/1; MG/1
1 TABLET ORAL
Status: DISCONTINUED | OUTPATIENT
Start: 2021-01-15 | End: 2021-01-15

## 2021-01-15 RX ORDER — ONDANSETRON 4 MG/1
4 TABLET, ORALLY DISINTEGRATING ORAL EVERY 30 MIN PRN
Status: DISCONTINUED | OUTPATIENT
Start: 2021-01-15 | End: 2021-01-15 | Stop reason: HOSPADM

## 2021-01-15 RX ORDER — CEFAZOLIN SODIUM 2 G/100ML
2 INJECTION, SOLUTION INTRAVENOUS
Status: COMPLETED | OUTPATIENT
Start: 2021-01-15 | End: 2021-01-15

## 2021-01-15 RX ADMIN — ONDANSETRON 4 MG: 2 INJECTION INTRAMUSCULAR; INTRAVENOUS at 18:07

## 2021-01-15 RX ADMIN — CEFAZOLIN SODIUM 2 G: 2 INJECTION, SOLUTION INTRAVENOUS at 17:50

## 2021-01-15 RX ADMIN — TRANEXAMIC ACID 1 G: 10 INJECTION, SOLUTION INTRAVENOUS at 18:11

## 2021-01-15 RX ADMIN — SODIUM CITRATE AND CITRIC ACID MONOHYDRATE 30 ML: 500; 334 SOLUTION ORAL at 17:43

## 2021-01-15 RX ADMIN — DEXAMETHASONE SODIUM PHOSPHATE 4 MG: 4 INJECTION, SOLUTION INTRA-ARTICULAR; INTRALESIONAL; INTRAMUSCULAR; INTRAVENOUS; SOFT TISSUE at 17:56

## 2021-01-15 RX ADMIN — NALBUPHINE HYDROCHLORIDE 5 MG: 10 INJECTION, SOLUTION INTRAMUSCULAR; INTRAVENOUS; SUBCUTANEOUS at 21:44

## 2021-01-15 RX ADMIN — FENTANYL CITRATE 100 MCG: 50 INJECTION, SOLUTION INTRAMUSCULAR; INTRAVENOUS at 13:39

## 2021-01-15 RX ADMIN — SODIUM CHLORIDE, POTASSIUM CHLORIDE, SODIUM LACTATE AND CALCIUM CHLORIDE: 600; 310; 30; 20 INJECTION, SOLUTION INTRAVENOUS at 10:05

## 2021-01-15 RX ADMIN — SODIUM CHLORIDE, POTASSIUM CHLORIDE, SODIUM LACTATE AND CALCIUM CHLORIDE: 600; 310; 30; 20 INJECTION, SOLUTION INTRAVENOUS at 16:51

## 2021-01-15 RX ADMIN — AZITHROMYCIN MONOHYDRATE 500 MG: 500 INJECTION, POWDER, LYOPHILIZED, FOR SOLUTION INTRAVENOUS at 17:55

## 2021-01-15 RX ADMIN — ACETAMINOPHEN 975 MG: 325 TABLET, FILM COATED ORAL at 21:42

## 2021-01-15 RX ADMIN — SODIUM CHLORIDE, POTASSIUM CHLORIDE, SODIUM LACTATE AND CALCIUM CHLORIDE: 600; 310; 30; 20 INJECTION, SOLUTION INTRAVENOUS at 18:35

## 2021-01-15 RX ADMIN — FENTANYL CITRATE 100 MCG: 50 INJECTION, SOLUTION INTRAMUSCULAR; INTRAVENOUS at 14:44

## 2021-01-15 RX ADMIN — SCOPALAMINE 1 PATCH: 1 PATCH, EXTENDED RELEASE TRANSDERMAL at 21:46

## 2021-01-15 RX ADMIN — Medication 1 MILLI-UNITS/MIN: at 12:08

## 2021-01-15 RX ADMIN — OXYTOCIN-SODIUM CHLORIDE 0.9% IV SOLN 30 UNIT/500ML 500 ML: 30-0.9/5 SOLUTION at 18:08

## 2021-01-15 RX ADMIN — OXYTOCIN-SODIUM CHLORIDE 0.9% IV SOLN 30 UNIT/500ML 100 ML/HR: 30-0.9/5 SOLUTION at 22:00

## 2021-01-15 RX ADMIN — Medication 50 MCG/MIN: at 17:52

## 2021-01-15 RX ADMIN — ACETAMINOPHEN 650 MG: 325 TABLET, FILM COATED ORAL at 10:57

## 2021-01-15 ASSESSMENT — ACTIVITIES OF DAILY LIVING (ADL)
TOILETING_ISSUES: NO
FALL_HISTORY_WITHIN_LAST_SIX_MONTHS: NO

## 2021-01-15 NOTE — PROVIDER NOTIFICATION
01/15/21 1438   Provider Notification   Provider Name/Title Dr. Cespedes   Method of Notification At Bedside   Orders to turn off Pit now.

## 2021-01-15 NOTE — PROVIDER NOTIFICATION
01/15/21 1500   Provider Notification   Provider Name/Title Dr. Cespedes   Method of Notification At Bedside   Dr. Cespedes at bedside with VD.

## 2021-01-15 NOTE — H&P
Discussed situation with the patient--fetal heart tracing has exhibited now recurrent, deep variable decelerations to the 50s with contractions. Cervix still 8cm, now becoming edematous. No movement of fetal head with any maternal effort, no pelvic pressure but worsening pain.     /59   Temp 97.9  F (36.6  C) (Oral)   Resp 20   LMP 2020    Gen--uncomfortable with contractions  CV--RRR  Abd--Gravid, NTTP, EFW >10.5#  SVE--/-1, cervix becoming edematous  Ext--DTRs normal    Hemoglobin   Date Value Ref Range Status   01/15/2021 13.2 11.7 - 15.7 g/dL Final     Discussed fetal intolerance of labor, and will proceed with primary  section.     Janeth Cespedes MD

## 2021-01-15 NOTE — PLAN OF CARE
Data: [facility] to [facility]: 1/15/2021  9:28 AM.  Patient admitted for induction for macrosomnia. Patient is a .  Prenatal record reviewed. Pregnancy  has been complicated by AMA, grand-multip.  Gestational Age 40w5d. VSS. Fetal movement present. Patient denies leaking of vaginal fluid/rupture of membranes, abdominal pain, pelvic pressure, nausea, vomiting, headache, visual disturbances, epigastric or URQ pain, significant edema. Support person is present.   Action: Verbal consent for EFM.  Admission assessment completed. Bill of rights reviewed.  Response: Patient verbalized agreement with plan. Will contact Dr Janeth Cespedes with update and further orders.

## 2021-01-15 NOTE — PROVIDER NOTIFICATION
01/15/21 1350   Provider Notification   Provider Name/Title Dr. Cespedes   Method of Notification Electronic Page   Text page to Dr. Cespedes:  SVE 6/70/-3, still posterior and high. Pt getting more uncomfortable. IV Fentanyl given. FHT's with occ VD's but moderate variability and accelerations.

## 2021-01-15 NOTE — H&P
No significant change in general health status based on examination of the patient, review of Nursing Admission Database and prenatal record. 37yo  at 41w0d for post-dates induction with suspected fetal macrosomia. GBS negative.    EFW: 9lb 8oz    Janeth Cespedes MD

## 2021-01-15 NOTE — PROVIDER NOTIFICATION
01/15/21 1050   Provider Notification   Provider Name/Title Dr. Cespedes   Method of Notification At Bedside   Dr. Cespedes at bedside. SVE by MD 4.5/70/-3. AROM by MD with clear amniotic fluid return. T's cat I tracing. Intrapartum orders received.    MD orders to initiate IV Pit per protocol in one hour PRN. Pt agreeable with POC--all questions answered.

## 2021-01-16 LAB
ABO + RH BLD: NORMAL
ABO + RH BLD: NORMAL
BLOOD BANK CMNT PATIENT-IMP: NORMAL
DATE RH IMM GL GVN: NORMAL
FETAL CELL SCN BLD QL ROSETTE: NORMAL
HGB BLD-MCNC: 10.6 G/DL (ref 11.7–15.7)
RH IG VIALS RECOM PATIENT: NORMAL

## 2021-01-16 PROCEDURE — 86900 BLOOD TYPING SEROLOGIC ABO: CPT | Performed by: OBSTETRICS & GYNECOLOGY

## 2021-01-16 PROCEDURE — 250N000013 HC RX MED GY IP 250 OP 250 PS 637: Performed by: OBSTETRICS & GYNECOLOGY

## 2021-01-16 PROCEDURE — 250N000011 HC RX IP 250 OP 636: Performed by: ANESTHESIOLOGY

## 2021-01-16 PROCEDURE — 85461 HEMOGLOBIN FETAL: CPT | Performed by: OBSTETRICS & GYNECOLOGY

## 2021-01-16 PROCEDURE — 120N000001 HC R&B MED SURG/OB

## 2021-01-16 PROCEDURE — 250N000011 HC RX IP 250 OP 636: Performed by: OBSTETRICS & GYNECOLOGY

## 2021-01-16 PROCEDURE — 85018 HEMOGLOBIN: CPT | Performed by: OBSTETRICS & GYNECOLOGY

## 2021-01-16 PROCEDURE — 86901 BLOOD TYPING SEROLOGIC RH(D): CPT | Performed by: OBSTETRICS & GYNECOLOGY

## 2021-01-16 PROCEDURE — 36415 COLL VENOUS BLD VENIPUNCTURE: CPT | Performed by: OBSTETRICS & GYNECOLOGY

## 2021-01-16 RX ADMIN — DIPHENHYDRAMINE HYDROCHLORIDE 25 MG: 50 INJECTION, SOLUTION INTRAMUSCULAR; INTRAVENOUS at 00:57

## 2021-01-16 RX ADMIN — OXYCODONE HYDROCHLORIDE 5 MG: 5 TABLET ORAL at 10:11

## 2021-01-16 RX ADMIN — DOCUSATE SODIUM 50 MG AND SENNOSIDES 8.6 MG 2 TABLET: 8.6; 5 TABLET, FILM COATED ORAL at 20:44

## 2021-01-16 RX ADMIN — KETOROLAC TROMETHAMINE 30 MG: 30 INJECTION, SOLUTION INTRAMUSCULAR at 00:57

## 2021-01-16 RX ADMIN — OXYCODONE HYDROCHLORIDE 5 MG: 5 TABLET ORAL at 22:23

## 2021-01-16 RX ADMIN — DIPHENHYDRAMINE HYDROCHLORIDE 25 MG: 25 CAPSULE ORAL at 20:43

## 2021-01-16 RX ADMIN — NALBUPHINE HYDROCHLORIDE 2.5 MG: 10 INJECTION, SOLUTION INTRAMUSCULAR; INTRAVENOUS; SUBCUTANEOUS at 04:50

## 2021-01-16 RX ADMIN — PRENATAL VITAMINS-IRON FUMARATE 27 MG IRON-FOLIC ACID 0.8 MG TABLET 1 TABLET: at 08:29

## 2021-01-16 RX ADMIN — ACETAMINOPHEN 975 MG: 325 TABLET, FILM COATED ORAL at 10:11

## 2021-01-16 RX ADMIN — IBUPROFEN 800 MG: 800 TABLET ORAL at 19:21

## 2021-01-16 RX ADMIN — SODIUM CHLORIDE, SODIUM LACTATE, POTASSIUM CHLORIDE, CALCIUM CHLORIDE AND DEXTROSE MONOHYDRATE: 5; 600; 310; 30; 20 INJECTION, SOLUTION INTRAVENOUS at 02:48

## 2021-01-16 RX ADMIN — KETOROLAC TROMETHAMINE 30 MG: 30 INJECTION, SOLUTION INTRAMUSCULAR at 07:11

## 2021-01-16 RX ADMIN — HUMAN RHO(D) IMMUNE GLOBULIN 300 MCG: 300 INJECTION, SOLUTION INTRAMUSCULAR at 10:32

## 2021-01-16 RX ADMIN — ACETAMINOPHEN 975 MG: 325 TABLET, FILM COATED ORAL at 04:52

## 2021-01-16 RX ADMIN — DOCUSATE SODIUM 50 MG AND SENNOSIDES 8.6 MG 2 TABLET: 8.6; 5 TABLET, FILM COATED ORAL at 08:29

## 2021-01-16 RX ADMIN — KETOROLAC TROMETHAMINE 30 MG: 30 INJECTION, SOLUTION INTRAMUSCULAR at 12:54

## 2021-01-16 RX ADMIN — ACETAMINOPHEN 975 MG: 325 TABLET, FILM COATED ORAL at 22:23

## 2021-01-16 RX ADMIN — OXYCODONE HYDROCHLORIDE 5 MG: 5 TABLET ORAL at 18:05

## 2021-01-16 RX ADMIN — OXYCODONE HYDROCHLORIDE 5 MG: 5 TABLET ORAL at 05:55

## 2021-01-16 RX ADMIN — ACETAMINOPHEN 975 MG: 325 TABLET, FILM COATED ORAL at 15:50

## 2021-01-16 NOTE — ANESTHESIA POSTPROCEDURE EVALUATION
Patient: Isaías Ontiveros    Procedure(s):   SECTION    Diagnosis:Fetal heart rate decelerations affecting management of mother [O36.8390]  Diagnosis Additional Information: No value filed.    Anesthesia Type:  No value filed.    Note:  Anesthesia Post Evaluation    Patient location during evaluation: OB PACU  Patient participation: Able to participate in evaluation but full recovery from regional anesthesia has not yet ocurrred but is anticipated to occur within 48 hours  Level of consciousness: awake and alert  Pain management: adequate  Airway patency: patent  Cardiovascular status: acceptable  Respiratory status: acceptable  Hydration status: acceptable  PONV: controlled             Last vitals:  Vitals:    01/15/21 1905 01/15/21 1910 01/15/21 1915   BP: 103/51 108/52 114/56   Resp:      Temp:      SpO2:   98%         Electronically Signed By: Bora Walls MD  January 15, 2021  7:29 PM

## 2021-01-16 NOTE — BRIEF OP NOTE
Brief Operative Note    Pre-operative diagnosis: 35yo  at 41 weeks for post-dates induction      Suspected fetal macrosomia with cephalopelvic disproportion      Fetal intolerance of labor  Post-operative diagnosis Same      LGA infant with OFC >99%    Procedure: Procedure(s):   SECTION  Surgeon: Surgeon(s) and Role:     * Janeth Cespedes MD - Primary        Jessica Lara MD - Assist  Anesthesia: Spinal   Quantitated blood loss: 1401cc  Drains:  [place]  Specimens:   ID Type Source Tests Collected by Time Destination   1 :  Placenta Placenta SEE PROVIDERS ORDERS Janeth Cespedes MD 1/15/2021  6:09 PM    2 :  Cord blood Blood OR DOCUMENTATION ONLY Janeth Cespedes MD 1/15/2021  6:07 PM    3 :  Cord blood, arterial Blood OR DOCUMENTATION ONLY Janeth Cespedes MD 1/15/2021  6:07 PM    4 :  Cord blood, venous Blood OR DOCUMENTATION ONLY Janeth Cespedes MD 1/15/2021  6:07 PM      Findings: Liveborn male, weight 9#14oz, OFC 16 inches. Left uterine artery extension requiring [place] suture.     Janeth Cespedes MD

## 2021-01-16 NOTE — PLAN OF CARE
VSS. Up to bathroom. Camargo removed at 0530. Pain managed with tylenol, toradol and oxycodone. Breastfeeding well with minimal assistance from staff. Bonding appropriately with infant and attentive to cares. Friend supportive at bedside.    Patients mobililty level scored using the bedside mobility assistance tool (BMAT). Patient is at a mobility level test number: 3. Mobility equipment used: none required. Required assist of 1 staff members. Further use of BMAT scoring required.

## 2021-01-16 NOTE — OP NOTE
Procedure Date: 01/15/2021      PREOPERATIVE DIAGNOSES:   1.  A 36-year-old -0-0-5, at 41 weeks gestation.   2.  Suspected fetal macrosomia.   3.  Non-reassuring fetal tracing during labor.      POSTOPERATIVE DIAGNOSES:   1.  A 36-year-old -0-0-5, at 41 weeks gestation.   2.  Confirmed LGA infant with large head circumference.      PROCEDURE:  Primary low transverse  section.      SURGEON:  Janeth Cespedes MD      ASSISTANT:  Jessica Lara MD      ANESTHESIA:  Spinal.      INDICATIONS:  This is a 36-year-old -0-0-5 who presented to Labor and Delivery at 41 weeks' gestation for postdates induction.  The patient's pregnancy had been complicated by suspicion of fetal macrosomia with estimated fetal weight greater than 98th percentile at 36 weeks gestation.  The patient had previously been scheduled for induction of labor at 40 weeks gestation given the large fetal size, but declined induction at that time.  At the time of induction at 41 weeks, estimated fetal weight was 9/2-10 pounds.  She has previously delivered an infant greater than 9 pounds, but this was greater than 18 years ago.  The risk of shoulder dystocia was well described.  She was admitted the morning of 01/15 and the cervix was 4 cm dilated with fetal station at -3, floating.  Artificial rupture of membranes was performed yielding clear fluid and her labor pattern was active within 2 hours.  Oxytocin augmentation was initiated given minimal progress over the first 4 hours of labor.  She then had more painful contractions and progressed to approximately 8 cm dilation.  Over a period of 3 hours, she made no further change despite painful contractions and the fetal station was at -1.  Additionally, for the 3 hours prior to delivery, she was having intermittent, long variable decelerations to the 50s with contractions.  These would resolve with position change and fluid, but became recurrent prior to delivery, despite  discontinuing oxytocin.  The fetal station was still at -1 with significant cervical edema prior to delivery.  The patient was counseled regarding the inappropriate labor pattern for a multiparous patient with large estimated fetal size and minimal cervical change.  Additionally, given the non-reassuring tracing, urgent delivery was recommended.      DESCRIPTION OF PROCEDURE:  The patient was taken to the operating room where her spinal anesthesia was found to be adequate.  She was prepped and draped in the normal sterile fashion in the dorsal supine position with a leftward tilt.  She was given 2 grams of Ancef as well as azithromycin prior to procedure.  A Camargo catheter was placed.  A Pfannenstiel skin incision was made with a scalpel and carried through to the underlying layer of fascia.  The fascia was then scored in the midline and the fascial incision was extended laterally with Benjamin scissors.  The superior aspect of the fascial incision was then grasped with Kocher clamps, elevated, and the rectus muscles were dissected off both sharply and bluntly.  The same procedure was undertaken on the inferior aspect of the incision.  The peritoneal cavity was then identified and entered bluntly.  This incision was extended bluntly.  A bladder blade was placed.  Given the large fetal size and extended labor, significant distention of the lower uterine segment was noted.  The hysterotomy was, therefore, performed as high as possible in the operative window.  This was then extended bluntly.  The infant's head was floating in the pelvis.  This was delivered without complication with a tight nuchal cord reduced immediately after delivery of the head, the remainder of the infant was delivered, noting a loop of cord around the arm and shoulder which were reduced after delivery.  Given the vigorous nature of the infant, delayed cord clamping was performed for 60 seconds.  The infant was then handed off to the nurses.  The  placenta was then delivered with vigorous uterine massage.  The uterus was exteriorized and cleared of all clots and debris.  The hysterotomy was then repaired with 0 Vicryl in a running locked fashion.  A second layer of horizontal imbrication was also performed.  Significant bleeding was noted along the left-hand aspect of the hysterotomy consistent with left uterine artery laceration.  Several O'Clinton sutures were placed to obtain hemostasis, the bleeding was significant during this portion of the case.  Additionally, several additional sutures were placed at the right corner of the hysterotomy as well.  The uterus was then returned to the abdomen.  Both suture sites were hemostatic.  Surgicel powder was applied throughout the hysterotomy.  The fascia was then closed with 0 Vicryl in a running suture.  The subcutaneous tissue was closed with 3-0 Vicryl in a running suture.  The skin was closed with absorbable staples.  Sponge, lap and needle counts were correct x 2.      Dr. Mahajan was called into the room to assist with control of hemostasis due to the left uterine artery laceration.      FINDINGS OF DELIVERY:  Liveborn infant male, Apgars of 9 and 9.  Weight of 4450 grams with head circumference 16 inches.  Head circumference was greater than 99th percentile.  Normal uterus, tubes and ovaries.  Quantitative blood loss from the delivery was 1401 mL.         CAS SAMANO MD             D: 2021   T: 2021   MT: ILA      Name:     BARBI CUENCA   MRN:      [id number]        Account:        [id number]   :      1985           Procedure Date: 01/15/2021      Document: [id number]

## 2021-01-16 NOTE — PLAN OF CARE
Data: Isaías Ontiveros transferred to [facility] via cart at 2230. Baby transferred via parent's arms.  Action: Receiving unit notified of transfer: Yes. Patient and family notified of room change. Report given to Saritha SOTELO RN  at time of transfer. Belongings sent to receiving unit. Accompanied by Registered Nurse. Oriented patient to surroundings. Call light within reach. ID bands double-checked with receiving RN.  Response: Patient tolerated transfer and is stable.

## 2021-01-16 NOTE — PROGRESS NOTES
[facility]   Obstetrics Progress Note    Subjective: This is the patient's first day since  delivery. She is doing well.  She is urinating on her own. Pain is controlled with medication.    Objective:   All vitals stable  Temp: 98.3  F (36.8  C) Temp src: Oral BP: 108/59 Pulse: 79   Resp: 20 SpO2: 98 % O2 Device: None (Room air)      EXAM:  Constitutional: healthy, alert, no distress.   Abdomen: Abdomen soft, non-tender. BS normal. No masses, fundus is firm.  JOINT/EXTREMITIES: extremities normal   Incision: clean, dry and dressing in place.     Last hemoglobin was   Hemoglobin   Date Value Ref Range Status   2021 10.6 (L) 11.7 - 15.7 g/dL Final   ]    Assessment: Stable postpartum course.    Plan: Routine care. Ambulation encouraged  Breast feeding strategies discussed  Pain control measures as needed  Reportable signs and symptoms dicussed with the patient  Anticipate discharge tomorrow    Jessica Lara MD

## 2021-01-16 NOTE — ANESTHESIA CARE TRANSFER NOTE
Patient: Isaías Ontiveros    Procedure(s):   SECTION    Diagnosis: Fetal heart rate decelerations affecting management of mother [O36.8390]  Diagnosis Additional Information: No value filed.    Anesthesia Type:   No value filed.     Note:  Airway :Room Air  Patient transferred to:Labor and Delivery  Comments: VSS.  Spontaneously breathing room air.  Report given to RN.Handoff Report: Identifed the Patient, Identified the Reponsible Provider, Reviewed the pertinent medical history, Discussed the surgical course, Reviewed Intra-OP anesthesia mangement and issues during anesthesia, Set expectations for post-procedure period and Allowed opportunity for questions and acknowledgement of understanding      Vitals: (Last set prior to Anesthesia Care Transfer)    CRNA VITALS  1/15/2021 1814 - 1/15/2021 1852      1/15/2021             NIBP:  (!) 93/33    NIBP Mean:  56                Electronically Signed By: SANDI Rodriguez CRNA  January 15, 2021  6:52 PM

## 2021-01-16 NOTE — ANESTHESIA PREPROCEDURE EVALUATION
Anesthesia Pre-Procedure Evaluation    Patient: Isaías Ontiveros   MRN: [id number] : 1985          Preoperative Diagnosis: Fetal heart rate decelerations affecting management of mother [O36.8390]    Procedure(s):   SECTION    Past Medical History:   Diagnosis Date     Anemia      Depressive disorder     Not on meds     Mental disorder     anxiety per pt, no meds     NO ACTIVE PROBLEMS      History reviewed. No pertinent surgical history.  Anesthesia Evaluation     .             ROS/MED HX    ENT/Pulmonary:  - neg pulmonary ROS     Neurologic:  - neg neurologic ROS     Cardiovascular:         METS/Exercise Tolerance:     Hematologic:  - neg hematologic  ROS       Musculoskeletal:  - neg musculoskeletal ROS       GI/Hepatic:     (+) GERD       Renal/Genitourinary:  - ROS Renal section negative       Endo:  - neg endo ROS   (+) Obesity, .      Psychiatric:     (+) psychiatric history anxiety and depression      Infectious Disease:  - neg infectious disease ROS       Malignancy:      - no malignancy   Other:    (+) Possibly pregnant                         Physical Exam      Airway   Mallampati: II  TM distance: >3 FB  Neck ROM: full    Dental     Cardiovascular   Rhythm and rate: regular and normal  (-) no murmur    Pulmonary    breath sounds clear to auscultation    Other findings: Lab Test        01/15/21     11/22/19     11/21/19     03/19/17     03/19/17     10/14/15     10/14/15     03/30/15                       1101          0653          0134          1834          1834          1005          1005          1704          WBC           --           --           --           --          6.0           --          3.6*         5.2           HGB          13.2         12.0         14.0           < >        12.4           < >        11.5*        12.6          MCV           --           --           --           --          90            --          85           89            PLT           --           --   "         --           --          272           --          262          302            < > = values in this interval not displayed.                  Lab Test        10/14/15     03/30/15                       1005          1704          NA           139          140           POTASSIUM    3.9          3.5           CHLORIDE     106          106           CO2          28           26            BUN          7            4*            CR           0.56         0.58          ANIONGAP     5            8             DIA          8.8          9.2           GLC          87           86                  Lab Results   Component Value Date    WBC 6.0 03/19/2017    HGB 13.2 01/15/2021    HCT 39.1 03/19/2017     03/19/2017     10/14/2015    POTASSIUM 3.9 10/14/2015    CHLORIDE 106 10/14/2015    CO2 28 10/14/2015    BUN 7 10/14/2015    CR 0.56 10/14/2015    GLC 87 10/14/2015    DIA 8.8 10/14/2015    ALBUMIN 3.6 10/14/2015    PROTTOTAL 7.4 10/14/2015    ALT 12 10/14/2015    AST 11 10/14/2015    ALKPHOS 44 10/14/2015    BILITOTAL 0.3 10/14/2015    TSH 1.93 03/30/2015    T4 0.90 03/30/2015    HCG Negative 05/16/2005    HCGS Negative 07/21/2004       Preop Vitals  BP Readings from Last 3 Encounters:   01/15/21 100/59   11/22/19 96/65   07/03/18 101/59    Pulse Readings from Last 3 Encounters:   11/22/19 76   07/03/18 69   03/19/17 101      Resp Readings from Last 3 Encounters:   01/15/21 20   11/22/19 16   07/03/18 16    SpO2 Readings from Last 3 Encounters:   11/21/19 100%   03/19/17 99%   03/19/17 100%      Temp Readings from Last 1 Encounters:   01/15/21 97.9  F (36.6  C) (Oral)    Ht Readings from Last 1 Encounters:   11/21/19 1.651 m (5' 5\")      Wt Readings from Last 1 Encounters:   11/21/19 96.2 kg (212 lb)    Estimated body mass index is 35.28 kg/m  as calculated from the following:    Height as of 11/21/19: 1.651 m (5' 5\").    Weight as of 11/21/19: 96.2 kg (212 lb).       Anesthesia Plan      History & " Physical Review  History and physical reviewed and following examination; no interval change.    ASA Status:  2 .        Plan for Spinal   PONV prophylaxis:  Ondansetron (or other 5HT-3)         Postoperative Care  Postoperative pain management:  IV analgesics, Oral pain medications and Neuraxial analgesia.      Consents  Anesthetic plan, risks, benefits and alternatives discussed with:  Patient..                 Vin Arcos MD                    .

## 2021-01-16 NOTE — PLAN OF CARE
Vitals stable. Incision covered with island dressing, dried drainage with no increase this shift. Pain well controlled with scheduled and PRN meds, see MAR. Rhogam given. Pt unable to void post burrell removal, was removed at 0530, bladder scanned a few times (inaccurate readings). Straight cath'ed at 1300, 700ml out. Pt ambulating well, attempting frequently. Will reassess at 1800.

## 2021-01-17 ENCOUNTER — APPOINTMENT (OUTPATIENT)
Dept: INTERPRETER SERVICES | Facility: CLINIC | Age: 36
End: 2021-01-17
Payer: COMMERCIAL

## 2021-01-17 VITALS
RESPIRATION RATE: 16 BRPM | SYSTOLIC BLOOD PRESSURE: 98 MMHG | TEMPERATURE: 97.8 F | DIASTOLIC BLOOD PRESSURE: 38 MMHG | OXYGEN SATURATION: 98 % | HEART RATE: 74 BPM

## 2021-01-17 PROCEDURE — 250N000013 HC RX MED GY IP 250 OP 250 PS 637: Performed by: OBSTETRICS & GYNECOLOGY

## 2021-01-17 RX ORDER — AMOXICILLIN 250 MG
1 CAPSULE ORAL 2 TIMES DAILY PRN
Qty: 40 TABLET | Refills: 0 | Status: SHIPPED | OUTPATIENT
Start: 2021-01-17

## 2021-01-17 RX ORDER — IBUPROFEN 800 MG/1
800 TABLET, FILM COATED ORAL EVERY 8 HOURS PRN
Qty: 30 TABLET | Refills: 0 | Status: SHIPPED | OUTPATIENT
Start: 2021-01-17

## 2021-01-17 RX ORDER — OXYCODONE HYDROCHLORIDE 5 MG/1
5 TABLET ORAL EVERY 4 HOURS PRN
Qty: 12 TABLET | Refills: 0 | Status: SHIPPED | OUTPATIENT
Start: 2021-01-17

## 2021-01-17 RX ORDER — ACETAMINOPHEN 325 MG/1
650 TABLET ORAL EVERY 6 HOURS
Qty: 30 TABLET | Refills: 0 | Status: SHIPPED | OUTPATIENT
Start: 2021-01-17

## 2021-01-17 RX ADMIN — OXYCODONE HYDROCHLORIDE 5 MG: 5 TABLET ORAL at 02:39

## 2021-01-17 RX ADMIN — IBUPROFEN 800 MG: 800 TABLET ORAL at 06:51

## 2021-01-17 RX ADMIN — OXYCODONE HYDROCHLORIDE 5 MG: 5 TABLET ORAL at 06:51

## 2021-01-17 RX ADMIN — DOCUSATE SODIUM 50 MG AND SENNOSIDES 8.6 MG 2 TABLET: 8.6; 5 TABLET, FILM COATED ORAL at 10:28

## 2021-01-17 RX ADMIN — OXYCODONE HYDROCHLORIDE 5 MG: 5 TABLET ORAL at 13:41

## 2021-01-17 RX ADMIN — OXYCODONE HYDROCHLORIDE 5 MG: 5 TABLET ORAL at 10:30

## 2021-01-17 RX ADMIN — ACETAMINOPHEN 975 MG: 325 TABLET, FILM COATED ORAL at 04:07

## 2021-01-17 RX ADMIN — IBUPROFEN 800 MG: 800 TABLET ORAL at 12:45

## 2021-01-17 RX ADMIN — IBUPROFEN 800 MG: 800 TABLET ORAL at 00:42

## 2021-01-17 RX ADMIN — PRENATAL VITAMINS-IRON FUMARATE 27 MG IRON-FOLIC ACID 0.8 MG TABLET 1 TABLET: at 10:27

## 2021-01-17 RX ADMIN — ACETAMINOPHEN 975 MG: 325 TABLET, FILM COATED ORAL at 10:28

## 2021-01-17 NOTE — PLAN OF CARE
Vss. Pt has been able to drink fluids well and emptying bladder without difficulty. Independent in self and baby cares. Fundal checks WNL. Pain managed with abdominal binder, ibuprofen, tylenol, oxycodone, and hot pack. Pt reports she is able to sleep comfortably. Indicates her pain is mostly while she is getting in and out of bed and when she's feeding (increased cramping). Breastfeeding without difficulty. Bonding well with baby. Pt's cousin remains at bedside and is supportive of pt and baby.     Patients mobililty level scored using the bedside mobility assistance tool (BMAT). Patient is at a mobility level test number: 4. Mobility equipment used: none required. Required assist of 0 staff members. Further use of BMAT scoring not required.

## 2021-01-17 NOTE — PLAN OF CARE
Pt meeting all expected goals for discharge home on POD #2. Discharge orders placed by MD and instructions reviewed with pt--all questions answered. Pt aware of need to follow-up in OB clinic in 2 weeks. Pt left with all belongings and clothed infant in car seat at ______.

## 2021-01-17 NOTE — PROGRESS NOTES
POD2  Pain controlled, no concerns. Requests discharge.  BP (!) 98/38   Pulse 74   Temp 97.8  F (36.6  C) (Oral)   Resp 16   LMP 04/05/2020   SpO2 98%   Breastfeeding Unknown    NAD  Abd Soft, ND  Inc; CDI  Ext 1+ edema    A/P:POD2 s/p LTCS  - Rx oxycodone, tylenol, ibuprofen and suppository at her request  - F/u 2 and 8 wks  - Reviewed discharge instructions    Nkechi Montenegro MD

## 2021-01-17 NOTE — DISCHARGE INSTRUCTIONS

## 2021-01-19 LAB — COPATH REPORT: NORMAL

## 2021-01-22 NOTE — DISCHARGE SUMMARY
Patient was admitted for induction of labor on 21 due to post-dates pregnancy and large estimated fetal size. Her labor was complicated by fetal intolerance of contractions as well as suspected cephalopelvic disproportion. She underwent urgent  section on 21 without complication. She was discharged home on POD#2 meeting all milestones. She will follow up in 2 and 8 weeks postpartum.     Janeth Cespedes MD

## 2021-08-28 ENCOUNTER — OFFICE VISIT (OUTPATIENT)
Dept: URGENT CARE | Facility: URGENT CARE | Age: 36
End: 2021-08-28
Payer: COMMERCIAL

## 2021-08-28 VITALS
SYSTOLIC BLOOD PRESSURE: 120 MMHG | RESPIRATION RATE: 21 BRPM | HEART RATE: 70 BPM | OXYGEN SATURATION: 99 % | DIASTOLIC BLOOD PRESSURE: 77 MMHG | TEMPERATURE: 98.2 F

## 2021-08-28 DIAGNOSIS — M94.0 COSTOCHONDRITIS: Primary | ICD-10-CM

## 2021-08-28 DIAGNOSIS — R42 LIGHTHEADEDNESS: ICD-10-CM

## 2021-08-28 DIAGNOSIS — R07.0 THROAT PAIN: ICD-10-CM

## 2021-08-28 DIAGNOSIS — R07.9 ACUTE CHEST PAIN: ICD-10-CM

## 2021-08-28 LAB
BASOPHILS # BLD AUTO: 0 10E3/UL (ref 0–0.2)
BASOPHILS NFR BLD AUTO: 0 %
DEPRECATED S PYO AG THROAT QL EIA: NEGATIVE
EOSINOPHIL # BLD AUTO: 0.1 10E3/UL (ref 0–0.7)
EOSINOPHIL NFR BLD AUTO: 2 %
ERYTHROCYTE [DISTWIDTH] IN BLOOD BY AUTOMATED COUNT: 14.5 % (ref 10–15)
GROUP A STREP BY PCR: NOT DETECTED
HCT VFR BLD AUTO: 35.1 % (ref 35–47)
HGB BLD-MCNC: 10.6 G/DL (ref 11.7–15.7)
LYMPHOCYTES # BLD AUTO: 2.3 10E3/UL (ref 0.8–5.3)
LYMPHOCYTES NFR BLD AUTO: 31 %
MCH RBC QN AUTO: 26 PG (ref 26.5–33)
MCHC RBC AUTO-ENTMCNC: 30.2 G/DL (ref 31.5–36.5)
MCV RBC AUTO: 86 FL (ref 78–100)
MONOCYTES # BLD AUTO: 0.5 10E3/UL (ref 0–1.3)
MONOCYTES NFR BLD AUTO: 7 %
NEUTROPHILS # BLD AUTO: 4.4 10E3/UL (ref 1.6–8.3)
NEUTROPHILS NFR BLD AUTO: 60 %
PLATELET # BLD AUTO: 294 10E3/UL (ref 150–450)
RBC # BLD AUTO: 4.08 10E6/UL (ref 3.8–5.2)
SARS-COV-2 RNA RESP QL NAA+PROBE: NEGATIVE
TROPONIN I SERPL-MCNC: <0.015 UG/L (ref 0–0.04)
WBC # BLD AUTO: 7.4 10E3/UL (ref 4–11)

## 2021-08-28 PROCEDURE — U0005 INFEC AGEN DETEC AMPLI PROBE: HCPCS | Performed by: INTERNAL MEDICINE

## 2021-08-28 PROCEDURE — U0003 INFECTIOUS AGENT DETECTION BY NUCLEIC ACID (DNA OR RNA); SEVERE ACUTE RESPIRATORY SYNDROME CORONAVIRUS 2 (SARS-COV-2) (CORONAVIRUS DISEASE [COVID-19]), AMPLIFIED PROBE TECHNIQUE, MAKING USE OF HIGH THROUGHPUT TECHNOLOGIES AS DESCRIBED BY CMS-2020-01-R: HCPCS | Performed by: INTERNAL MEDICINE

## 2021-08-28 PROCEDURE — 87651 STREP A DNA AMP PROBE: CPT | Performed by: INTERNAL MEDICINE

## 2021-08-28 PROCEDURE — 85025 COMPLETE CBC W/AUTO DIFF WBC: CPT | Performed by: INTERNAL MEDICINE

## 2021-08-28 PROCEDURE — 99203 OFFICE O/P NEW LOW 30 MIN: CPT | Performed by: INTERNAL MEDICINE

## 2021-08-28 PROCEDURE — 93000 ELECTROCARDIOGRAM COMPLETE: CPT | Performed by: INTERNAL MEDICINE

## 2021-08-28 PROCEDURE — 36415 COLL VENOUS BLD VENIPUNCTURE: CPT | Performed by: INTERNAL MEDICINE

## 2021-08-28 PROCEDURE — 84484 ASSAY OF TROPONIN QUANT: CPT | Performed by: INTERNAL MEDICINE

## 2021-08-28 RX ORDER — PREDNISONE 5 MG/1
TABLET ORAL
COMMUNITY
Start: 2021-08-26

## 2021-08-28 NOTE — PROGRESS NOTES
"    Assessment & Plan     Costochondritis  Reassurance to the patient.      Acute chest pain  Considered broad differential diagnosis of chest pain to include myocardial ischemia, cardiac arrhythmia, pulmonary embolism, pulmonary infection, pneumothorax, GERD, esophageal spasm, chest wall sources.  Clinical exam, EKG and labs supports a diagnosis of costochondritis.  - EKG 12-lead complete w/read - Clinics  - Symptomatic COVID-19 Virus (Coronavirus) by PCR Nose  - CBC with platelets and differential; Future  - Troponin I; Future  - CBC with platelets and differential  - Troponin I    Throat pain  - Streptococcus A Rapid Screen w/Reflex to PCR  - Symptomatic COVID-19 Virus (Coronavirus) by PCR Nose  - Group A Streptococcus PCR Throat Swab    Lightheadedness  - Symptomatic COVID-19 Virus (Coronavirus) by PCR Nose    Darren Espitia MD  [facility]AB[facility] CenterEVELINA Metz is a 36 year old who presents for the following health issues     HPI   Noted some chest pain over the past hour.  She also was experiencing this yesterday and associated it with a new medication (prednisone).  The sensation is a heaviness or \"stiffness\" in the central chest.  Denies shortness of breath.  Denies nausea.  No diaphoresis.  Denies palpitations. She is noting some lightheadedness for the past several weeks.  She denies actual vertigo.  Appetite and intake have been normal. Denies exertional dyspnea or exercise intolerance.  No change in chest discomfort with eating or breathing.    PMH: no prior hospitalizations, recent diagnosis of rheumatoid arthritis    PSH:     Review of Systems   Constitutional, HEENT, cardiovascular, pulmonary, gi and gu systems are negative, except as otherwise noted.      Objective    /77   Pulse 70   Temp 98.2  F (36.8  C) (Tympanic)   Resp 21   SpO2 99%   There is no height or weight on file to calculate BMI.  Physical Exam   GENERAL APPEARANCE: healthy, alert and no " distress  HENT: ear canals and TM's normal and nose and mouth without ulcers or lesions  NECK: no adenopathy, no asymmetry, masses, or scars and thyroid normal to palpation  RESP: lungs clear to auscultation - no rales, rhonchi or wheezes  CV: regular rates and rhythm, normal S1 S2, no S3 or S4 and no murmur, click or rub  ABDOMEN: soft, nontender, without hepatosplenomegaly or masses and bowel sounds normal  M/SKEL: chest wall tenderness along the costosternal border    Results for orders placed or performed in visit on 08/28/21 (from the past 24 hour(s))   Streptococcus A Rapid Screen w/Reflex to PCR    Specimen: Throat; Swab   Result Value Ref Range    Group A Strep antigen Negative Negative   CBC with platelets and differential    Narrative    The following orders were created for panel order CBC with platelets and differential.  Procedure                               Abnormality         Status                     ---------                               -----------         ------                     CBC with platelets and d...[[id number]]  Abnormal            Final result                 Please view results for these tests on the individual orders.   Troponin I   Result Value Ref Range    Troponin I <0.015 0.000 - 0.045 ug/L   CBC with platelets and differential   Result Value Ref Range    WBC Count 7.4 4.0 - 11.0 10e3/uL    RBC Count 4.08 3.80 - 5.20 10e6/uL    Hemoglobin 10.6 (L) 11.7 - 15.7 g/dL    Hematocrit 35.1 35.0 - 47.0 %    MCV 86 78 - 100 fL    MCH 26.0 (L) 26.5 - 33.0 pg    MCHC 30.2 (L) 31.5 - 36.5 g/dL    RDW 14.5 10.0 - 15.0 %    Platelet Count 294 150 - 450 10e3/uL    % Neutrophils 60 %    % Lymphocytes 31 %    % Monocytes 7 %    % Eosinophils 2 %    % Basophils 0 %    Absolute Neutrophils 4.4 1.6 - 8.3 10e3/uL    Absolute Lymphocytes 2.3 0.8 - 5.3 10e3/uL    Absolute Monocytes 0.5 0.0 - 1.3 10e3/uL    Absolute Eosinophils 0.1 0.0 - 0.7 10e3/uL    Absolute Basophils 0.0 0.0 - 0.2 10e3/uL        EKG,which I have personally reviewed and interpreted, is negative for any ischemia.  Rhythm is NSR.

## 2021-08-28 NOTE — PATIENT INSTRUCTIONS
All of your blood tests and the heart tracing are looking very good.  The tenderness in your chest points us to the rib cage as a source of your chest pain.   This is a condition called costochondritis.  The same things that you are taking for your arthritis should help the costochondritis.  You could also try some topical soothing creams like IcyHot or BioFreeze. This should go away over the next couple of weeks.

## 2021-11-09 ENCOUNTER — HOSPITAL ENCOUNTER (EMERGENCY)
Facility: CLINIC | Age: 36
Discharge: HOME | End: 2021-11-09
Attending: EMERGENCY MEDICINE | Admitting: EMERGENCY MEDICINE
Payer: COMMERCIAL

## 2021-11-09 VITALS
SYSTOLIC BLOOD PRESSURE: 112 MMHG | OXYGEN SATURATION: 99 % | RESPIRATION RATE: 16 BRPM | HEART RATE: 87 BPM | DIASTOLIC BLOOD PRESSURE: 72 MMHG | TEMPERATURE: 97.3 F
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
## 2021-11-09 PROCEDURE — 99283 EMERGENCY DEPT VISIT LOW MDM: CPT

## 2021-11-09 PROCEDURE — 86706 HEP B SURFACE ANTIBODY: CPT | Performed by: EMERGENCY MEDICINE

## 2021-11-09 PROCEDURE — 86803 HEPATITIS C AB TEST: CPT | Performed by: EMERGENCY MEDICINE

## 2021-11-09 PROCEDURE — 36415 COLL VENOUS BLD VENIPUNCTURE: CPT | Performed by: EMERGENCY MEDICINE

## 2021-11-09 PROCEDURE — 86704 HEP B CORE ANTIBODY TOTAL: CPT | Performed by: EMERGENCY MEDICINE

## 2021-11-09 PROCEDURE — 87389 HIV-1 AG W/HIV-1&-2 AB AG IA: CPT | Performed by: EMERGENCY MEDICINE

## 2021-11-09 PROCEDURE — 87340 HEPATITIS B SURFACE AG IA: CPT | Performed by: EMERGENCY MEDICINE

## 2021-11-09 PROCEDURE — 87522 HEPATITIS C REVRS TRNSCRPJ: CPT | Performed by: EMERGENCY MEDICINE

## 2021-11-09 NOTE — ED PROVIDER NOTES
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  Reportedly no known hx communicable disease.    Review of Systems   All other systems reviewed and are negative.    Allergies:  Peanut Oil    Medications:    acetaminophen (TYLENOL) 325 MG tablet  acetaminophen (TYLENOL) 500 MG tablet  aspirin (ASA) 325 MG EC tablet  cyanocobalamin (VITAMIN B-12) 100 MCG tablet  ferrous fumarate 65 mg, [place]. FE,-Vitamin C 125 mg (VITRON C)  MG TABS tablet  ibuprofen (ADVIL/MOTRIN) 800 MG tablet  Misc. Devices (BREAST PUMP) MISC  oxyCODONE (ROXICODONE) 5 MG tablet  predniSONE (DELTASONE) 5 MG tablet  Prenatal Vit-Fe Fumarate-FA (PRENATAL MULTIVITAMIN  PLUS IRON) 27-0.8 MG TABS per tablet  senna-docusate (SENOKOT-S/PERICOLACE) 8.6-50 MG tablet  Vitamin D3 (CHOLECALCIFEROL) 25 mcg (1000 units) tablet        Past Medical History:       Past Medical History:   Diagnosis Date     Anemia      Depressive disorder      Mental disorder      NO ACTIVE PROBLEMS      Patient Active Problem List   Diagnosis     Fatigue     Vitamin D deficiency     CARDIOVASCULAR SCREENING; LDL GOAL LESS THAN 160     Indication for care in labor and delivery, antepartum     Vaginal delivery     Indication for care in labor or delivery     Encounter for triage in pregnant patient     Postpartum care and examination immediately after delivery     Labor and delivery, indication for care         Past Surgical History:      Past Surgical History:   Procedure Laterality Date      SECTION N/A 1/15/2021    Procedure:  SECTION;  Surgeon: Janeth Cespedes MD;  Location:  L+D        Family History:      No family history on file.    Social History:  Social History     Socioeconomic History     Marital status: Single     Spouse name: Not on file     Number of children: Not on file     Years of education: Not on  file     Highest education level: Not on file   Occupational History     Not on file   Tobacco Use     Smoking status: Never Smoker     Smokeless tobacco: Never Used   Substance and Sexual Activity     Alcohol use: No     Drug use: No     Sexual activity: Yes   Other Topics Concern     Not on file   Social History Narrative     Not on file     Social Determinants of Health     Financial Resource Strain: Not on file   Food Insecurity: Not on file   Transportation Needs: Not on file   Physical Activity: Not on file   Stress: Not on file   Social Connections: Not on file   Intimate Partner Violence: Not on file   Housing Stability: Not on file       Physical Exam     Patient Vitals for the past 24 hrs:   BP Temp Temp src Pulse Resp SpO2   11/09/21 1143 122/84 97.3  F (36.3  C) Temporal 87 18 99 %       Physical Exam  General: Patient is alert and cooperative.  Musculoskeletal: Normal appearing involved finger; no puncture site visible.   Neurological: oriented, normal strength, sensation, and coordination.   Skin: no puncture wound visible.  Psychiatric: Normal mood and affect. Normal behavior and judgement.      Emergency Department Course     Laboratory:  Labs Ordered and Resulted from Time of ED Arrival to Time of ED Departure - No data to display   Routine post-exposure labs ordered, pending.     Emergency Department Course:    Disposition:  The patient was discharged to home.     Impression & Plan     Medical Decision Making:     Low risk needle stick from client with no known hx hepatitis/hiv.  Routine labs ordered, sent out, f/u  to discuss.  No indication for post-exposure prophylaxis in this circumstance.       Diagnosis:    ICD-10-CM    1. Accidental hypodermic needlestick injury  W46.0XXA      Scribe Disclosure:  I, Shai Cevallos, am serving as a scribe at 2:35 PM on 11/9/2021 to document services personally performed by Eber Juarez MD based on my observations and the provider's statements to me.                  Eber Juarez MD  11/11/21 1297

## 2021-11-09 NOTE — ED TRIAGE NOTES
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 Pt states the needle stuck her on her R. Finger #3. Pt's [facility] to [facility] her blood drawn but pt states that is too long of a wait and she wants to get checked today. Pt was told the client \"doesn't have anything\" so she shouldn't worry about it.  "

## 2021-11-10 LAB
HBV CORE AB SERPL QL IA: NONREACTIVE
HBV SURFACE AB SERPL IA-ACNC: 45.97 M[IU]/ML
HBV SURFACE AG SERPL QL IA: NONREACTIVE
HCV AB SERPL QL IA: NONREACTIVE
HCV RNA SERPL NAA+PROBE-ACNC: NOT DETECTED IU/ML
HIV 1+2 AB+HIV1 P24 AG SERPL QL IA: NONREACTIVE

## 2021-11-11 ENCOUNTER — TELEPHONE (OUTPATIENT)
Dept: EMERGENCY MEDICINE | Facility: CLINIC | Age: 36
End: 2021-11-11
Payer: COMMERCIAL

## 2021-11-11 NOTE — TELEPHONE ENCOUNTER
[facility] Emergency Department Lab result notification:    Reason for call  Blood and body fluid exposure  Lab Result  The following blood and bodily fluid lab results from a recent exposure were all negative (nonreactive) for:     Hepatitis C antibody     Hepatitis C RNA quantitative    Hepatitis B surface antigen (agn)    Hepatitis B Core antibody    HIV Antigen Antibody Combo.     The following blood and body fluid lab result from a recent exposure were Positive (reactive) for:   Hepatitis B surface antibody (shandra)     [Note: If Hepatitis B surface ANTIBODY (shandra) is reactive/positive, this indicates Patient has immunity]  Recommendations in Treatment per [facility] ED lab result Blood exposure protocol    ED visit Date: 11/9/21  Symptoms reported at ED visit Medical Decision Making:     Low risk needle stick from client with no known hx hepatitis/hiv.  Routine labs ordered, sent out, f/u  to discuss.  No indication for post-exposure prophylaxis in this circumstance.         Diagnosis:      ICD-10-CM     1. Accidental hypodermic needlestick injury  W46.0XXA         Miscellaneous information Eber Juarez MD  11/11/21 1353     Current symptoms  Pt says she is ok, but doesn't remember getting the Hep B series vaccine. It's not in [facility] either. She came [place] the [place] a [place] ago.  Recommendations/Instructions  Patient notified of lab result and treatment recommendations.  RN reviewed information about Hep B vaccine series. She was transferred to  for ED f/u visit. Knows she will need retesting.     Contact your PCP clinic or return to the Emergency department if your:    Symptoms return.    Symptoms worsen or other concerning symptom's.    Tricia Kern RN  [facility]  Emergency Dept Lab Result RN  Ph# [phone]

## 2021-11-11 NOTE — RESULT ENCOUNTER NOTE
The Hepatitis C RNA quantitative from a recent exposure is negative (nonreactive).  Recommendations in treatment per [facility] ED Lab Result Blood and body fluid exposure protocol

## 2022-08-17 ENCOUNTER — OFFICE VISIT (OUTPATIENT)
Dept: URGENT CARE | Facility: URGENT CARE | Age: 37
End: 2022-08-17
Payer: COMMERCIAL

## 2022-08-17 VITALS
TEMPERATURE: 97.9 F | OXYGEN SATURATION: 100 % | RESPIRATION RATE: 16 BRPM | HEART RATE: 77 BPM | SYSTOLIC BLOOD PRESSURE: 117 MMHG | DIASTOLIC BLOOD PRESSURE: 75 MMHG

## 2022-08-17 DIAGNOSIS — Z20.818 STREP THROAT EXPOSURE: ICD-10-CM

## 2022-08-17 DIAGNOSIS — J02.9 SORE THROAT: Primary | ICD-10-CM

## 2022-08-17 LAB
DEPRECATED S PYO AG THROAT QL EIA: NEGATIVE
GROUP A STREP BY PCR: NOT DETECTED

## 2022-08-17 PROCEDURE — 87651 STREP A DNA AMP PROBE: CPT | Performed by: PHYSICIAN ASSISTANT

## 2022-08-17 PROCEDURE — 99213 OFFICE O/P EST LOW 20 MIN: CPT | Performed by: PHYSICIAN ASSISTANT

## 2022-08-17 RX ORDER — AMOXICILLIN 875 MG
875 TABLET ORAL 2 TIMES DAILY
Qty: 20 TABLET | Refills: 0 | Status: SHIPPED | OUTPATIENT
Start: 2022-08-17 | End: 2022-08-27

## 2022-08-17 RX ORDER — IBUPROFEN 800 MG/1
800 TABLET, FILM COATED ORAL EVERY 8 HOURS PRN
Qty: 30 TABLET | Refills: 0 | Status: SHIPPED | OUTPATIENT
Start: 2022-08-17

## 2022-08-17 NOTE — PATIENT INSTRUCTIONS
(J02.9) Sore throat  (primary encounter diagnosis)  Comment:   Plan: Streptococcus A Rapid Screen w/Reflex to PCR,         Group A Streptococcus PCR Throat Swab,         ibuprofen (ADVIL/MOTRIN) 800 MG tablet            (Z20.818) Strep throat exposure  Comment:   Plan: amoxicillin (AMOXIL) 875 MG tablet, ibuprofen         (ADVIL/MOTRIN) 800 MG tablet

## 2022-08-17 NOTE — PROGRESS NOTES
Patient presents with:  URI: Sore throat since last 2 days.      J02.9) Sore throat  (primary encounter diagnosis)  Comment:   Plan: Streptococcus A Rapid Screen w/Reflex to PCR,         Group A Streptococcus PCR Throat Swab,         ibuprofen (ADVIL/MOTRIN) 800 MG tablet            (Z20.818) Strep throat exposure  Comment: 3 of her children currently have strep  Plan: amoxicillin (AMOXIL) 875 MG tablet, ibuprofen         (ADVIL/MOTRIN) 800 MG tablet                SUBJECTIVE:   Isaías Ontiveros is a 37 year old female who presents today with throat pain for the past 2 days.  She is here with 2 of her children today, both of whom are being treated for strep.  Another child at home also has strep.  She denies any cough or runny nose.  Subjective fevers.          Past Medical History:   Diagnosis Date     Anemia      Depressive disorder     Not on meds     Mental disorder     anxiety per pt, no meds     NO ACTIVE PROBLEMS          Current Outpatient Medications   Medication Sig Dispense Refill     Multiple Vitamins-Iron (DAILY-YVETTE/IRON/BETA-CAROTENE) TABS TAKE 1 TABLET BY MOUTH DAILY. (Patient not taking: Reported on 10/19/2020) 30 tablet 7     Social History     Tobacco Use     Smoking status: Never Smoker     Smokeless tobacco: Never Used   Substance Use Topics     Alcohol use: Not on file     Family History   Problem Relation Age of Onset     Diabetes Mother      Diabetes Father          ROS:    10 point ROS of systems including Constitutional, Eyes, Respiratory, Cardiovascular, Gastroenterology, Genitourinary, Integumentary, Muscularskeletal, Psychiatric ,neurological were all negative except for pertinent positives noted in my HPI       OBJECTIVE:  /75 (BP Location: Left arm, Patient Position: Sitting, Cuff Size: Adult Regular)   Pulse 77   Temp 97.9  F (36.6  C) (Tympanic)   Resp 16   SpO2 100%   Physical Exam:  GENERAL APPEARANCE: healthy, alert and no distress  EYES: EOMI,  PERRL, conjunctiva  clear  HENT: ear canals and TM's normal.  Nose and mouth without ulcers, erythema or lesions  HENT: tonsillar hypertrophy, tonsillar erythema and tonsillar exudate  NECK: supple, nontender, no lymphadenopathy  NECK: bilateral anterior cervical adenopathy  RESP: lungs clear to auscultation - no rales, rhonchi or wheezes  CV: regular rates and rhythm, normal S1 S2, no murmur noted  ABDOMEN:  soft, nontender, no HSM or masses and bowel sounds normal  NEURO: Normal strength and tone, sensory exam grossly normal,  normal speech and mentation  SKIN: no suspicious lesions or rashes

## 2022-08-17 NOTE — LETTER
[facility]  [address] [address]  [phone]      August 17, 2022    RE:  Isaías Ontiveros                                                                                                                                                       [address]  [address] [address]            To whom it may concern:    Isaías Ontiveros was seen in clinic today for illness.  She may return to work after she has been on the antibiotic for 24 hours.          Sincerely,        JAMAAL Keene [facility]

## (undated) DEVICE — LINEN FULL SHEET 5511

## (undated) DEVICE — GLOVE PROTEXIS POWDER FREE 6.5 ORTHOPEDIC 2D73ET65

## (undated) DEVICE — SOL NACL 0.9% IRRIG 1000ML BOTTLE 2F7124

## (undated) DEVICE — SPONGE LAP 18X18" X8435

## (undated) DEVICE — LINEN BABY BLANKET 5434

## (undated) DEVICE — BLADE CLIPPER SGL USE 9680

## (undated) DEVICE — CAP BABY PINK/BLUE IC-2

## (undated) DEVICE — LINEN TOWEL PACK X10 5473

## (undated) DEVICE — LINEN DRAPE 54X72" 5467

## (undated) DEVICE — TRANSFER DEVICE BLOOD NDL HOLDER 364880

## (undated) DEVICE — SU VICRYL 0 CT-1 36" J346H

## (undated) DEVICE — SU VICRYL 1 CTX 36" J371H

## (undated) DEVICE — LINEN HALF SHEET 5512

## (undated) DEVICE — SU VICRYL 3-0 CT-1 36" J344H

## (undated) DEVICE — CATH TRAY FOLEY SURESTEP 16FR DRAIN BAG STATOCK A899916

## (undated) DEVICE — BAG CLEAR TRASH 1.3M 39X33" P4040C

## (undated) DEVICE — STOCKING SLEEVE VASOPRESS COMPRESSION CALF MED 18" VP501M

## (undated) DEVICE — PACK C-SECTION LF PL15OTA83B

## (undated) DEVICE — SU VICRYL 3-0 SH 27" J316H

## (undated) DEVICE — GLOVE PROTEXIS POWDER FREE SMT 6.5  2D72PT65X

## (undated) DEVICE — PREP CHLORAPREP 26ML TINTED HI-LITE ORANGE 930815

## (undated) DEVICE — SOLIDIFIER FLUID RED 1500ML LIQUID KIT SYS POWDER ISOB1500

## (undated) DEVICE — STPL SKIN SUBCUTICULAR INSORB  2030

## (undated) DEVICE — GLOVE PROTEXIS MICRO 6.5  2D73PM65

## (undated) DEVICE — SUCTION CANISTER MEDIVAC LINER 3000ML W/LID 65651-530

## (undated) DEVICE — PAD CHUX UNDERPAD 30X36" P3036C

## (undated) DEVICE — GLOVE PROTEXIS BLUE W/NEU-THERA 6.5  2D73EB65

## (undated) DEVICE — SURGICEL POWDER ABSORBABLE HEMOSTAT 3GM 3013SP

## (undated) DEVICE — ESU GROUND PAD ADULT W/CORD E7507

## (undated) DEVICE — SOL WATER IRRIG 1000ML BOTTLE 2F7114

## (undated) RX ORDER — DEXAMETHASONE SODIUM PHOSPHATE 4 MG/ML
INJECTION, SOLUTION INTRA-ARTICULAR; INTRALESIONAL; INTRAMUSCULAR; INTRAVENOUS; SOFT TISSUE
Status: DISPENSED
Start: 2021-01-15

## (undated) RX ORDER — TRANEXAMIC ACID 100 MG/ML
INJECTION, SOLUTION INTRAVENOUS
Status: DISPENSED
Start: 2021-01-15

## (undated) RX ORDER — FENTANYL CITRATE-0.9 % NACL/PF 10 MCG/ML
PLASTIC BAG, INJECTION (ML) INTRAVENOUS
Status: DISPENSED
Start: 2021-01-15

## (undated) RX ORDER — MORPHINE SULFATE 1 MG/ML
INJECTION, SOLUTION EPIDURAL; INTRATHECAL; INTRAVENOUS
Status: DISPENSED
Start: 2021-01-15

## (undated) RX ORDER — OXYTOCIN/0.9 % SODIUM CHLORIDE 30/500 ML
PLASTIC BAG, INJECTION (ML) INTRAVENOUS
Status: DISPENSED
Start: 2021-01-15

## (undated) RX ORDER — ONDANSETRON 2 MG/ML
INJECTION INTRAMUSCULAR; INTRAVENOUS
Status: DISPENSED
Start: 2021-01-15